# Patient Record
(demographics unavailable — no encounter records)

---

## 2024-11-25 NOTE — REASON FOR VISIT
[Initial Evaluation] : an initial evaluation for [FreeTextEntry2] : bilateral epistaxis [Interpreters_IDNumber] : [id number] [Interpreters_FullName] : [place] [TWNoteComboBox1] : Luxembourger

## 2024-11-25 NOTE — ASSESSMENT
[FreeTextEntry1] : EPISTAXIS INSTRUCTION HEMATOLOGY FOLLOW UP PLATELET ABNORMALITY NO CAUTERIZATION WHEN PLATELET ISSUES A POTENTIAL AGGRAVATING FACTOR IS ADVISED MUPIROCIN OINTMENT F/U HEMATOLOGY

## 2024-11-25 NOTE — PHYSICAL EXAM
[de-identified] : CAROLEE IMPACTED CERUMEN REMOVED/ HEARING IMPROVED [de-identified] : NASAL MUCOSAL CRUSTING/ NO ACTIVE BLEED [Normal] : mucosa is normal [Midline] : trachea located in midline position

## 2024-11-25 NOTE — HISTORY OF PRESENT ILLNESS
[de-identified] : 29 year old man presents for initial evaluation of bilateral epistaxis. He had his nose packed 2-3x per weeks. [place] of Jovani Jaramillo as his treatments in [facility] were limited to nasal packing and occasional platelet transfusion. Currently on Tranexamic Acid.  Bilateral epistaxis R>L. Nosebleeds can last 2-3x days. States has had cauterization. Reports a lot of nasal congestion and the cold weather makes his nosebleeds worse.

## 2024-12-06 NOTE — BEGINNING OF VISIT
[0] : 2) Feeling down, depressed, or hopeless: Not at all (0) [Pain Scale: ___] : On a scale of 1-10, today the patient's pain is a(n) [unfilled]. [Never] : Never [Date Discussed (MM/DD/YY): ___] : Discussed: [unfilled] [With Patient/Caregiver] : with Patient/Caregiver [Abdominal Pain] : no abdominal pain [Vomiting] : no vomiting [Constipation] : no constipation [de-identified] : no diarrhea  [de-identified] : no h/o colitis

## 2024-12-06 NOTE — HISTORY OF PRESENT ILLNESS
[Disease:__________________________] : Disease: [unfilled] [Date: ____________] : Patient's last distress assessment performed on [unfilled]. [0 - No Distress] : Distress Level: 0 [100: Normal, no complaints, no evidence of disease.] : 100: Normal, no complaints, no evidence of disease. 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 He had epistaxis for 3 days last week thus went to urgent care. He had his nose packed twice in the past 4 weeks. he has told me that [place] of Jovani Soullier as his treatments in [facility] were limited to nasal packing and occasional platelet transfusion. He has had rare occasions of packed red cell transfusion none in past year. He presented with a low platelet count noted by age 13 months and ecchymosis. He had spleen removal at age 3 with post operative bleeding and re-exploration of abdomen for packing and platelet infusion in [place]. His illness has not been responsive to steroid therapy. He has had at least one embolization treatment that he describes as an embolization of a bleeding site (CNS/ or head) trough and artery (possibly femoral?). He has been taking tranexamic acid 650 mg PO for bleeding episodes and more recently he has used this medication on a daily prophylactic status. [FreeTextEntry1] : tranexemic acid 650 mg PO daily; iron 65 mg two tablets [de-identified] : second visit: patient is present for laboratory results. No bleeding noted; occasional gum bleeding. He has been using the TSX  06/27/2024: He has had occasional nasal bleeding that has ceased with compression. He has temporality been without transemic acid medication. Prescription had been written (renewal) on 06/24/2024. No ER visit and no transfusion. 07/18/2024 Patient had successful completion of platelet function testing. Difficult interpretation of the aggregation curve possibly due to spontaneous aggregation. There is no family history of thalassemia or iron deficiency. He eats red meat twice a month.  08/21/2024 seen post ER visit this week. He required nasal packing, and he is stable post packing and retention of packing for one day. Bleeding has now stopped. Patient has worsening iron deficiency anemia.  10/2/2024 He is here at a 6 week follow up follow. one  episode of bleeding from nose for 2 days he did not go to hospital; no ER visit. no hospitalization. No falls.  __________ December 04, 2024 - Seen in a f/u visit today accompanied by wife Edith. Pt Latvian Speaking -  services utiliizied Twice during this visit - 1st - Mr. Mckenna ID [id number] self disconnected after 5 minutes, 2nd  Ms. Maloney ID # [id number]. Even with use of  Services patient would ask the wife to repeat the questions and then answer - this mode of communication was repeated throughout the visit. Did not remember exact doses of medications he was taking. Stated no change from what medicines were in his chart.  Pt stated he has been having recurrent intermittent episodes of bilateral epistaxis h/o Cauterization, evaluated by [facility] - advised Mupirocin ointment. Intermittent gum bleeding - unsure exact date of last dental evaluation -- stated maybe January 2024.  States he gets bored at home. No hospitalization, febrile illness, change in medical status.

## 2024-12-06 NOTE — PHYSICAL EXAM
[Fully active, able to carry on all pre-disease performance without restriction] : Status 0 - Fully active, able to carry on all pre-disease performance without restriction [Normal] : affect appropriate [de-identified] : midline abdominal old well healed surgical scar  [de-identified] : numerous tatooes; no ecchymosis, petechiae, bleeding, bruising noted

## 2024-12-06 NOTE — PHYSICAL EXAM
[Fully active, able to carry on all pre-disease performance without restriction] : Status 0 - Fully active, able to carry on all pre-disease performance without restriction [Normal] : affect appropriate [de-identified] : midline abdominal old well healed surgical scar  [de-identified] : numerous tatooes; no ecchymosis, petechiae, bleeding, bruising noted

## 2024-12-06 NOTE — BEGINNING OF VISIT
[0] : 2) Feeling down, depressed, or hopeless: Not at all (0) [Pain Scale: ___] : On a scale of 1-10, today the patient's pain is a(n) [unfilled]. [Never] : Never [Date Discussed (MM/DD/YY): ___] : Discussed: [unfilled] [With Patient/Caregiver] : with Patient/Caregiver [Abdominal Pain] : no abdominal pain [Vomiting] : no vomiting [Constipation] : no constipation [de-identified] : no diarrhea  [de-identified] : no h/o colitis

## 2024-12-06 NOTE — PHYSICAL EXAM
[Fully active, able to carry on all pre-disease performance without restriction] : Status 0 - Fully active, able to carry on all pre-disease performance without restriction [Normal] : affect appropriate [de-identified] : midline abdominal old well healed surgical scar  [de-identified] : numerous tatooes; no ecchymosis, petechiae, bleeding, bruising noted

## 2024-12-06 NOTE — HISTORY OF PRESENT ILLNESS
[Disease:__________________________] : Disease: [unfilled] [Date: ____________] : Patient's last distress assessment performed on [unfilled]. [0 - No Distress] : Distress Level: 0 [100: Normal, no complaints, no evidence of disease.] : 100: Normal, no complaints, no evidence of disease. 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 He had epistaxis for 3 days last week thus went to urgent care. He had his nose packed twice in the past 4 weeks. he has told me that [place] Jovani Soullier as his treatments in [facility] were limited to nasal packing and occasional platelet transfusion. He has had rare occasions of packed red cell transfusion none in past year. He presented with a low platelet count noted by age 13 months and ecchymosis. 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 His illness has not been responsive to steroid therapy. He has had at least one embolization treatment that he describes as an embolization of a bleeding site (CNS/ or head) trough and artery (possibly femoral?). He has been taking tranexamic acid 650 mg PO for bleeding episodes and more recently he has used this medication on a daily prophylactic status. [FreeTextEntry1] : tranexemic acid 650 mg PO daily; iron 65 mg two tablets [de-identified] : second visit: patient is present for laboratory results. No bleeding noted; occasional gum bleeding. He has been using the TSX  06/27/2024: He has had occasional nasal bleeding that has ceased with compression. He has temporality been without transemic acid medication. Prescription had been written (renewal) on 06/24/2024. No ER visit and no transfusion. 07/18/2024 Patient had successful completion of platelet function testing. Difficult interpretation of the aggregation curve possibly due to spontaneous aggregation. There is no family history of thalassemia or iron deficiency. He eats red meat twice a month.  08/21/2024 seen post ER visit this week. He required nasal packing, and he is stable post packing and retention of packing for one day. Bleeding has now stopped. Patient has worsening iron deficiency anemia.  10/2/2024 He is here at a 6 week follow up follow. one  episode of bleeding from nose for 2 days he did not go to hospital; no ER visit. no hospitalization. No falls.  __________ December 04, 2024 - Seen in a f/u visit today accompanied by wife Edith. Pt Thai Speaking -  services utiliizied Twice during this visit - 1st - Mr. Mckenna ID [id number] self disconnected after 5 minutes, 2nd  Ms. Maloney ID # [id number]. Even with use of  Services patient would ask the wife to repeat the questions and then answer - this mode of communication was repeated throughout the visit. Did not remember exact doses of medications he was taking. Stated no change from what medicines were in his chart.  Pt stated he has been having recurrent intermittent episodes of bilateral epistaxis h/o Cauterization, evaluated by [facility] - advised Mupirocin ointment. Intermittent gum bleeding - unsure exact date of last dental evaluation -- stated maybe January 2024.  States he gets bored at home. No hospitalization, febrile illness, change in medical status.

## 2024-12-06 NOTE — HISTORY OF PRESENT ILLNESS
[Disease:__________________________] : Disease: [unfilled] [Date: ____________] : Patient's last distress assessment performed on [unfilled]. [0 - No Distress] : Distress Level: 0 [100: Normal, no complaints, no evidence of disease.] : 100: Normal, no complaints, no evidence of disease. 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 He had epistaxis for 3 days last week thus went to urgent care. He had his nose packed twice in the past 4 weeks. he has told me that [place] Jovani Soullier as his treatments in [facility] were limited to nasal packing and occasional platelet transfusion. He has had rare occasions of packed red cell transfusion none in past year. He presented with a low platelet count noted by age 13 months and ecchymosis. 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 His illness has not been responsive to steroid therapy. He has had at least one embolization treatment that he describes as an embolization of a bleeding site (CNS/ or head) trough and artery (possibly femoral?). He has been taking tranexamic acid 650 mg PO for bleeding episodes and more recently he has used this medication on a daily prophylactic status. [FreeTextEntry1] : tranexemic acid 650 mg PO daily; iron 65 mg two tablets [de-identified] : second visit: patient is present for laboratory results. No bleeding noted; occasional gum bleeding. He has been using the TSX  06/27/2024: He has had occasional nasal bleeding that has ceased with compression. He has temporality been without transemic acid medication. Prescription had been written (renewal) on 06/24/2024. No ER visit and no transfusion. 07/18/2024 Patient had successful completion of platelet function testing. Difficult interpretation of the aggregation curve possibly due to spontaneous aggregation. There is no family history of thalassemia or iron deficiency. He eats red meat twice a month.  08/21/2024 seen post ER visit this week. He required nasal packing, and he is stable post packing and retention of packing for one day. Bleeding has now stopped. Patient has worsening iron deficiency anemia.  10/2/2024 He is here at a 6 week follow up follow. one  episode of bleeding from nose for 2 days he did not go to hospital; no ER visit. no hospitalization. No falls.  __________ December 04, 2024 - Seen in a f/u visit today accompanied by wife Edith. Pt Malaysian Speaking -  services utiliizied Twice during this visit - 1st - Mr. Mckenna ID [id number] self disconnected after 5 minutes, 2nd  Ms. Maloney ID # [id number]. Even with use of  Services patient would ask the wife to repeat the questions and then answer - this mode of communication was repeated throughout the visit. Did not remember exact doses of medications he was taking. Stated no change from what medicines were in his chart.  Pt stated he has been having recurrent intermittent episodes of bilateral epistaxis h/o Cauterization, evaluated by [facility] - advised Mupirocin ointment. Intermittent gum bleeding - unsure exact date of last dental evaluation -- stated maybe January 2024.  States he gets bored at home. No hospitalization, febrile illness, change in medical status.

## 2024-12-06 NOTE — BEGINNING OF VISIT
[0] : 2) Feeling down, depressed, or hopeless: Not at all (0) [Pain Scale: ___] : On a scale of 1-10, today the patient's pain is a(n) [unfilled]. [Never] : Never [Date Discussed (MM/DD/YY): ___] : Discussed: [unfilled] [With Patient/Caregiver] : with Patient/Caregiver [Abdominal Pain] : no abdominal pain [Vomiting] : no vomiting [Constipation] : no constipation [de-identified] : no diarrhea  [de-identified] : no h/o colitis

## 2024-12-06 NOTE — PHYSICAL EXAM
[Fully active, able to carry on all pre-disease performance without restriction] : Status 0 - Fully active, able to carry on all pre-disease performance without restriction [Normal] : affect appropriate [de-identified] : midline abdominal old well healed surgical scar  [de-identified] : numerous tatooes; no ecchymosis, petechiae, bleeding, bruising noted

## 2024-12-06 NOTE — REASON FOR VISIT
[Follow-Up Visit] : a follow-up visit for [Blood Count Assessment] : blood count assessment [Coagulopathy] : coagulopathy [Spouse] : spouse [[employer] ] : provided by [employer]   [Other: _____] : [unfilled] [Time Spent: ____ minutes] : Total time spent using  services: [unfilled] minutes. The patient's primary language is not English thus required  services. [FreeTextEntry2] : Jovani Soullier syndrome, Thromocytopenia  [Interpreters_IDNumber] : [id number] & [id number] [Interpreters_FullName] : Bala Maloney  [TWNoteComboBox1] : Finnish

## 2024-12-06 NOTE — ASSESSMENT
[Supportive] : Goals of care discussed with patient: Supportive [Palliative Care Plan] : not applicable at this time [FreeTextEntry1] : Alex Dumont is a 28-year-old male with a diagnosis of Jovani Soullier platelet abnormality. Diagnosis was established in 2023 in documents present in-patient cell phone in [facility]. I have reviewed these documents in Bengali with the patient in the past as documented in in the prior notes.  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 The epistaxis remains a problem  epistaxis is now controlled. Discussion of resumption of the oral iron therapy for the patient. Prescriptions are written for stool softener, iron, and trans emic acid.  RTC in 8 weeks for follow up.by Susana KAPLAN _______________ December 04, 2024 - Patient is a 30 y/o Bengali Speaking Male w h/o Jovani Soullier Platelet Abnormality, Splenectomy, Recurrent Epistaxis seen in a f/u visit today.   Today's  CBC - WBC = 16.32, Hgb = 9.7, PLT = 141, 000, MCV = 66.9, - results d/w pt & wife copy given  1) Jovani Soullier PLT Abnormality - Thombocytopenia - PLT = 141 000 - stable  pt on Tranexamic Acid to take prn for bleeding - Continue prn - prescriptions current w refill vWF activity & Antigen, Ferritin, Iron studies, results pending  2) Anemia - on Oral iron - continue oral iron with OJ or Vitamin C to promote absorption, incorporate dietary iron rich foods, take stool softener prn 3) Leukocytosis - no s/s of infection - like reactive will monitor & repeat  4) MIPS Measures Questionnaire Completed  5) f/u w PCP, Dentist, ENT & other providers at their discretion - pt has no PCP - will email [facility] for PCP referral  RTO in 1 month or sooner prn  Medication compliance, physician f/u recommendations was advised. Pt / wife counseled to bring names / doses of all medications during visit. Avoid OTC / herbal supplements.  Significant amount of time was spent on counseling & educating the patient during this visit.  All questions, concerns were addressed with patient & wife during this visit to ther satisfaction.  Case management, care plan d/w Dr Rodrigue Hubbard    Services were utilized twice - due to connectivity issues  Even with use of  Services patient would ask the wife to repeat the questions and then answer - this mode of communication was repeated throughout the visit.

## 2024-12-06 NOTE — RESULTS/DATA
[FreeTextEntry1] : platelet count 133 000; review of peripheral blood smear shows large platelets with basophilic center (appears as nucleoid but not nucleus); irregular borders. 05/08/2024 CBC WBC 9.50 HGB 9.2g/dL  000 copy given to the patient with an explanation 06/27/2024 CBC WBC 6 HGB 8.7 MCV 64  000 07/18/2024 CBC WBC 15.94 HGB 9.5g/dL MCV 66  000 08/21/2024 CBC WBC 15.2 HGB  .6g/dL  MCV 65  PL clumped _____________ December 04, 2024 - CBC - WBC = 16.32, Hgb = 9.7, PLT = 141, 000, MCV = 66.9,  ANC = 11.72. Monocyte = 1.61, Basophil # 0.22  Normal Retic Count Iron = 21, % Fe Sat = 5%, TIBC = 475, Ferritin = 7,  vWF activity & antigen WNL  _______________

## 2024-12-06 NOTE — REVIEW OF SYSTEMS
[Diarrhea: Grade 0] : Diarrhea: Grade 0 [Easy Bleeding] : a tendency for easy bleeding [Easy Bruising] : a tendency for easy bruising [Negative] : Allergic/Immunologic [Swollen Glands] : no swollen glands [de-identified] : numerous tattoos present trunk, arms and back without bleeding. [de-identified] : gums

## 2024-12-06 NOTE — REASON FOR VISIT
[Follow-Up Visit] : a follow-up visit for [Blood Count Assessment] : blood count assessment [Coagulopathy] : coagulopathy [Spouse] : spouse [[employer] ] : provided by [employer]   [Other: _____] : [unfilled] [Time Spent: ____ minutes] : Total time spent using  services: [unfilled] minutes. The patient's primary language is not English thus required  services. [FreeTextEntry2] : Jovani Soullier syndrome, Thromocytopenia  [Interpreters_IDNumber] : [id number] & [id number] [Interpreters_FullName] : Bala Maloney  [TWNoteComboBox1] : Burkinan

## 2024-12-06 NOTE — REASON FOR VISIT
[Follow-Up Visit] : a follow-up visit for [Blood Count Assessment] : blood count assessment [Coagulopathy] : coagulopathy [Spouse] : spouse [[employer] ] : provided by [employer]   [Other: _____] : [unfilled] [Time Spent: ____ minutes] : Total time spent using  services: [unfilled] minutes. The patient's primary language is not English thus required  services. [FreeTextEntry2] : Jovani Soullier syndrome, Thromocytopenia  [Interpreters_IDNumber] : [id number] & [id number] [Interpreters_FullName] : Bala Maloney  [TWNoteComboBox1] : Serbian

## 2024-12-06 NOTE — REVIEW OF SYSTEMS
[Diarrhea: Grade 0] : Diarrhea: Grade 0 [Easy Bleeding] : a tendency for easy bleeding [Easy Bruising] : a tendency for easy bruising [Negative] : Allergic/Immunologic [Swollen Glands] : no swollen glands [de-identified] : numerous tattoos present trunk, arms and back without bleeding. [de-identified] : gums

## 2024-12-06 NOTE — REVIEW OF SYSTEMS
[Diarrhea: Grade 0] : Diarrhea: Grade 0 [Easy Bleeding] : a tendency for easy bleeding [Easy Bruising] : a tendency for easy bruising [Negative] : Allergic/Immunologic [Swollen Glands] : no swollen glands [de-identified] : numerous tattoos present trunk, arms and back without bleeding. [de-identified] : gums

## 2024-12-06 NOTE — REASON FOR VISIT
[Follow-Up Visit] : a follow-up visit for [Blood Count Assessment] : blood count assessment [Coagulopathy] : coagulopathy [Spouse] : spouse [[employer] ] : provided by [employer]   [Other: _____] : [unfilled] [Time Spent: ____ minutes] : Total time spent using  services: [unfilled] minutes. The patient's primary language is not English thus required  services. [FreeTextEntry2] : Jovani Soullier syndrome, Thromocytopenia  [Interpreters_IDNumber] : [id number] & [id number] [Interpreters_FullName] : Bala Maloney  [TWNoteComboBox1] : Ecuadorean

## 2024-12-06 NOTE — ASSESSMENT
[Supportive] : Goals of care discussed with patient: Supportive [Palliative Care Plan] : not applicable at this time [FreeTextEntry1] : Alex Dumont is a 28-year-old male with a diagnosis of Jovani Soullier platelet abnormality. Diagnosis was established in 2023 in documents present in-patient cell phone in [facility]. I have reviewed these documents in [place] with the patient in the past as documented in in the prior notes.  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 The epistaxis remains a problem  epistaxis is now controlled. Discussion of resumption of the oral iron therapy for the patient. Prescriptions are written for stool softener, iron, and trans emic acid.  RTC in 8 weeks for follow up.by Susana KAPLAN _______________ December 04, 2024 - Patient is a 28 y/o [place]inyar[place] Speaking Male w h/o Jovani Soullier Platelet Abnormality, Splenectomy, Recurrent Epistaxis seen in a f/u visit today.   Today's  CBC - WBC = 16.32, Hgb = 9.7, PLT = 141, 000, MCV = 66.9, - results d/w pt & wife copy given  1) Jovani Soullier PLT Abnormality - Thombocytopenia - PLT = 141 000 - stable  pt on Tranexamic Acid to take prn for bleeding - Continue prn - prescriptions current w refill vWF activity & Antigen, Ferritin, Iron studies, results pending  2) Anemia - on Oral iron - continue oral iron with OJ or Vitamin C to promote absorption, incorporate dietary iron rich foods, take stool softener prn 3) Leukocytosis - no s/s of infection - like reactive will monitor & repeat  4) MIPS Measures Questionnaire Completed  5) f/u w PCP, Dentist, ENT & other providers at their discretion - pt has no PCP - will email [facility] for PCP referral  RTO in 1 month or sooner prn  Medication compliance, physician f/u recommendations was advised. Pt / wife counseled to bring names / doses of all medications during visit. Avoid OTC / herbal supplements.  Significant amount of time was spent on counseling & educating the patient during this visit.  All questions, concerns were addressed with patient & wife during this visit to ther satisfaction.  Case management, care plan d/w Dr Rodrigue Hubbard    Services were utilized twice - due to connectivity issues  Even with use of  Services patient would ask the wife to repeat the questions and then answer - this mode of communication was repeated throughout the visit.

## 2024-12-06 NOTE — ASSESSMENT
[Supportive] : Goals of care discussed with patient: Supportive [Palliative Care Plan] : not applicable at this time [FreeTextEntry1] : Alex Dumont is a 28-year-old male with a diagnosis of Jovani Soullier platelet abnormality. Diagnosis was established in 2023 in documents present in-patient cell phone in [facility]. I have reviewed these documents in [place] with the patient in the past as documented in in the prior notes.  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 The epistaxis remains a problem  epistaxis is now controlled. Discussion of resumption of the oral iron therapy for the patient. Prescriptions are written for stool softener, iron, and trans emic acid.  RTC in 8 weeks for follow up.by Susana KAPLAN _______________ December 04, 2024 - Patient is a 30 y/o [place] Speaking Male w h/o Jovani Soullier Platelet Abnormality, Splenectomy, Recurrent Epistaxis seen in a f/u visit today.   Today's  CBC - WBC = 16.32, Hgb = 9.7, PLT = 141, 000, MCV = 66.9, - results d/w pt & wife copy given  1) Jovani Soullier PLT Abnormality - Thombocytopenia - PLT = 141 000 - stable  pt on Tranexamic Acid to take prn for bleeding - Continue prn - prescriptions current w refill vWF activity & Antigen, Ferritin, Iron studies, results pending  2) Anemia - on Oral iron - continue oral iron with OJ or Vitamin C to promote absorption, incorporate dietary iron rich foods, take stool softener prn 3) Leukocytosis - no s/s of infection - like reactive will monitor & repeat  4) MIPS Measures Questionnaire Completed  5) f/u w PCP, Dentist, ENT & other providers at their discretion - pt has no PCP - will email [facility] for PCP referral  RTO in 1 month or sooner prn  Medication compliance, physician f/u recommendations was advised. Pt / wife counseled to bring names / doses of all medications during visit. Avoid OTC / herbal supplements.  Significant amount of time was spent on counseling & educating the patient during this visit.  All questions, concerns were addressed with patient & wife during this visit to ther satisfaction.  Case management, care plan d/w Dr Rodrigue Hubbard    Services were utilized twice - due to connectivity issues  Even with use of  Services patient would ask the wife to repeat the questions and then answer - this mode of communication was repeated throughout the visit.

## 2024-12-06 NOTE — BEGINNING OF VISIT
[0] : 2) Feeling down, depressed, or hopeless: Not at all (0) [Pain Scale: ___] : On a scale of 1-10, today the patient's pain is a(n) [unfilled]. [Never] : Never [Date Discussed (MM/DD/YY): ___] : Discussed: [unfilled] [With Patient/Caregiver] : with Patient/Caregiver [Abdominal Pain] : no abdominal pain [Vomiting] : no vomiting [Constipation] : no constipation [de-identified] : no diarrhea  [de-identified] : no h/o colitis

## 2024-12-06 NOTE — ASSESSMENT
[Supportive] : Goals of care discussed with patient: Supportive [Palliative Care Plan] : not applicable at this time [FreeTextEntry1] : Alex Dumont is a 28-year-old male with a diagnosis of Jovani Soullier platelet abnormality. Diagnosis was established in 2023 in documents present in-patient cell phone in [facility]. I have reviewed these documents in Bengali with the patient in the past as documented in in the prior notes.  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 The epistaxis remains a problem  epistaxis is now controlled. Discussion of resumption of the oral iron therapy for the patient. Prescriptions are written for stool softener, iron, and trans emic acid.  RTC in 8 weeks for follow up.by Susana KPALAN _______________ December 04, 2024 - Patient is a 28 y/o Bengali Speaking Male w h/o Jovani Soullier Platelet Abnormality, Splenectomy, Recurrent Epistaxis seen in a f/u visit today.   Today's  CBC - WBC = 16.32, Hgb = 9.7, PLT = 141, 000, MCV = 66.9, - results d/w pt & wife copy given  1) Jovani Soullier PLT Abnormality - Thombocytopenia - PLT = 141 000 - stable  pt on Tranexamic Acid to take prn for bleeding - Continue prn - prescriptions current w refill vWF activity & Antigen, Ferritin, Iron studies, results pending  2) Anemia - on Oral iron - continue oral iron with OJ or Vitamin C to promote absorption, incorporate dietary iron rich foods, take stool softener prn 3) Leukocytosis - no s/s of infection - like reactive will monitor & repeat  4) MIPS Measures Questionnaire Completed  5) f/u w PCP, Dentist, ENT & other providers at their discretion - pt has no PCP - will email [facility] for PCP referral  RTO in 1 month or sooner prn  Medication compliance, physician f/u recommendations was advised. Pt / wife counseled to bring names / doses of all medications during visit. Avoid OTC / herbal supplements.  Significant amount of time was spent on counseling & educating the patient during this visit.  All questions, concerns were addressed with patient & wife during this visit to ther satisfaction.  Case management, care plan d/w Dr Rodrigue Hubbard    Services were utilized twice - due to connectivity issues  Even with use of  Services patient would ask the wife to repeat the questions and then answer - this mode of communication was repeated throughout the visit.

## 2024-12-06 NOTE — REVIEW OF SYSTEMS
[Diarrhea: Grade 0] : Diarrhea: Grade 0 [Easy Bleeding] : a tendency for easy bleeding [Easy Bruising] : a tendency for easy bruising [Negative] : Allergic/Immunologic [Swollen Glands] : no swollen glands [de-identified] : numerous tattoos present trunk, arms and back without bleeding. [de-identified] : gums

## 2024-12-06 NOTE — HISTORY OF PRESENT ILLNESS
[Disease:__________________________] : Disease: [unfilled] [Date: ____________] : Patient's last distress assessment performed on [unfilled]. [0 - No Distress] : Distress Level: 0 [100: Normal, no complaints, no evidence of disease.] : 100: Normal, no complaints, no evidence of disease. 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 He had epistaxis for 3 days last week thus went to urgent care. He had his nose packed twice in the past 4 weeks. he has told me that [place] of Jovani Soullier as his treatments in [facility] were limited to nasal packing and occasional platelet transfusion. He has had rare occasions of packed red cell transfusion none in past year. He presented with a low platelet count noted by age 13 months and ecchymosis. He had spleen removal at age 3 with post operative bleeding and re-exploration of abdomen for packing and platelet infusion in [place]. His illness has not been responsive to steroid therapy. He has had at least one embolization treatment that he describes as an embolization of a bleeding site (CNS/ or head) trough and artery (possibly femoral?). He has been taking tranexamic acid 650 mg PO for bleeding episodes and more recently he has used this medication on a daily prophylactic status. [FreeTextEntry1] : tranexemic acid 650 mg PO daily; iron 65 mg two tablets [de-identified] : second visit: patient is present for laboratory results. No bleeding noted; occasional gum bleeding. He has been using the TSX  06/27/2024: He has had occasional nasal bleeding that has ceased with compression. He has temporality been without transemic acid medication. Prescription had been written (renewal) on 06/24/2024. No ER visit and no transfusion. 07/18/2024 Patient had successful completion of platelet function testing. Difficult interpretation of the aggregation curve possibly due to spontaneous aggregation. There is no family history of thalassemia or iron deficiency. He eats red meat twice a month.  08/21/2024 seen post ER visit this week. He required nasal packing, and he is stable post packing and retention of packing for one day. Bleeding has now stopped. Patient has worsening iron deficiency anemia.  10/2/2024 He is here at a 6 week follow up follow. one  episode of bleeding from nose for 2 days he did not go to hospital; no ER visit. no hospitalization. No falls.  __________ December 04, 2024 - Seen in a f/u visit today accompanied by wife Edith. Pt Citizen of [place] Speaking -  services utiliizied Twice during this visit - 1st - Mr. Mckenna ID [id number] self disconnected after 5 minutes, 2nd  Ms. Maloney ID # [id number]. Even with use of  Services patient would ask the wife to repeat the questions and then answer - this mode of communication was repeated throughout the visit. Did not remember exact doses of medications he was taking. Stated no change from what medicines were in his chart.  Pt stated he has been having recurrent intermittent episodes of bilateral epistaxis h/o Cauterization, evaluated by [facility] - advised Mupirocin ointment. Intermittent gum bleeding - unsure exact date of last dental evaluation -- stated maybe January 2024.  States he gets bored at home. No hospitalization, febrile illness, change in medical status.

## 2025-01-06 NOTE — HISTORY OF PRESENT ILLNESS
[de-identified] : epistasis and thrombocytopenia Evaluation in [facility]: finding consistent with Jovani Soullier Syndrome 28 yo gentleman presents. He had epistaxis for 3 days last week thus went to urgent care. He had his nose packed twice in the past 4 weeks. he has told me that [place] of Jovani Soullier as his treatments in [facility] were limited to nasal packing and occasional platelet transfusion. He has had rare occasions of packed red cell transfusion none in past year. He presented with a low platelet count noted by age 13 months and ecchymosis. He had spleen removal at age 3 with post operative bleeding and re-exploration of abdomen for packing and platelet infusion in [place]. His illness has not been responsive to steroid therapy. He has had at least one embolization treatment that he describes as an embolization of a bleeding site (CNS/ or head) trough and artery (possibly femoral?). He has been taking tranexamic acid 650 mg PO for bleeding episodes and more recently he has used this medication on a daily prophylactic status. [FreeTextEntry1] : tranexemic acid 650 mg PO daily; iron 65 mg two tablets [de-identified] : second visit: patient is present for laboratory results. No bleeding noted; occasional gum bleeding. He has been using the TSX  06/27/2024: He has had occasional nasal bleeding that has ceased with compression. He has temporality been without transemic acid medication. Prescription had been written (renewal) on 06/24/2024  10/2/2024 He is here at a 6 week follow up follow. one episode of bleeding from nose for 2 days he did not go to hospital: no ER visit. no hospitalization. No falls. Did not remember exact doses of medications he was taking. Stated no change from what medicines were in his chart.  01/02/2025 He was at the [facility] for nasal bleeding and a cold. Seen in ER on 27 December 2024.  he has taken transemic acid He has not picked up the transemic acid prescription yet that was placed on 30 December 2024

## 2025-01-06 NOTE — REVIEW OF SYSTEMS
[Nosebleeds] : nosebleeds [Swollen Glands] : no swollen glands [de-identified] : numerous tattoos present trunk, arms and back without bleeding. [de-identified] : gums

## 2025-01-06 NOTE — REASON FOR VISIT
[Other: _____] : [unfilled] [Time Spent: ____ minutes] : Total time spent using  services: [unfilled] minutes. The patient's primary language is not English thus required  services. [FreeTextEntry2] : Jovani Soullier syndrome, Thromocytopenia  [Interpreters_IDNumber] : [id number] [Interpreters_FullName] : Erich [TWNoteComboBox1] : Trinidadian

## 2025-01-06 NOTE — PHYSICAL EXAM
[de-identified] : midline abdominal old well healed surgical scar  [de-identified] : numerous tatooes; no ecchymosis, petechiae, bleeding, bruising noted

## 2025-01-06 NOTE — REASON FOR VISIT
[Other: _____] : [unfilled] [Time Spent: ____ minutes] : Total time spent using  services: [unfilled] minutes. The patient's primary language is not English thus required  services. [FreeTextEntry2] : Jovani Soullier syndrome, Thromocytopenia  [Interpreters_IDNumber] : [id number] [Interpreters_FullName] : Erich [TWNoteComboBox1] : Citizen of [place]

## 2025-01-06 NOTE — ASSESSMENT
[FreeTextEntry1] : Alex Dumont is a 28-year-old male with a diagnosis of Jovani Soullier platelet abnormality. Diagnosis was established in 2023 in documents present in-patient cell phone in [facility]. I have reviewed these documents in Nepali with the patient in the past as documented in in the prior notes.  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 The epistaxis remains a problem  epistaxis is now controlled. Discussion of resumption of the oral iron therapy for the patient. Prescriptions are written for stool softener, iron, and trans emic acid.  RTC in 8 weeks for follow up.by Susana KAPLAN _______________ December 04, 2024 - Patient is a 28 y/o Nepali Speaking Male w h/o Jovani Soullier Platelet Abnormality, Splenectomy, Recurrent Epistaxis seen in a f/u visit today.   27 December 2024 CBC - WBC = 13.56, Hgb = 9.0, PLT = 124, 000, MCV = 66.9, - results d/w pt & wife copy given of results and results of 4 December 2024  1) Jovani Soullier PLT Abnormality - Thrombocytopenia - PLT = 124 000 - characteristic of congenital thrombocytopenia  pt on Tranexamic Acid to take prn for bleeding - Continue prn - prescriptions current w refill Patient to increase dose to 1 tablet PO q 12h prn. Patient is advised to go to ER if bleeding cannot be controlled with Trans emic acid ; if bleeding is severe, he may need packing or platelet transfusion. Need to reduce platelet transfusion use unless absolutely necessary to prevent allo sensitization.  prior blood testing of vWF activity & Antigen were normal; Ferritin, Iron studies, results pending  2) Anemia - on Oral iron - continue oral iron with OJ or Vitamin C to promote absorption, incorporate dietary iron rich foods, take stool softener prn 3) Leukocytosis - no s/s of infection - like reactive will monitor & repeat  4) MIPS Measures Questionnaire Completed  5) f/u w PCP, Dentist, ENT & other providers at their discretion - pt has no PCP - will email [facility] for PCP referral  RTO in 1 month or sooner prn  Medication compliance, physician f/u recommendations was advised. Pt / wife counseled to bring names / doses of all medications during visit. Avoid OTC / herbal supplements.  Significant amount of time was spent on counseling & educating the patient during this visit.  All questions, concerns were addressed with patient & wife during this visit to ther satisfaction.  Case management, care plan d/w Dr Rodrigue Hubbard    Services were utilized

## 2025-01-06 NOTE — ASSESSMENT
[FreeTextEntry1] : Alex Dumont is a 28-year-old male with a diagnosis of Jovani Soullier platelet abnormality. Diagnosis was established in 2023 in documents present in-patient cell phone in [facility]. I have reviewed these documents in Maori with the patient in the past as documented in in the prior notes.  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 The epistaxis remains a problem  epistaxis is now controlled. Discussion of resumption of the oral iron therapy for the patient. Prescriptions are written for stool softener, iron, and trans emic acid.  RTC in 8 weeks for follow up.by Susana KAPLAN _______________ December 04, 2024 - Patient is a 28 y/o Maori Speaking Male w h/o Jovani Soullier Platelet Abnormality, Splenectomy, Recurrent Epistaxis seen in a f/u visit today.   27 December 2024 CBC - WBC = 13.56, Hgb = 9.0, PLT = 124, 000, MCV = 66.9, - results d/w pt & wife copy given of results and results of 4 December 2024  1) Jovani Soullier PLT Abnormality - Thrombocytopenia - PLT = 124 000 - characteristic of congenital thrombocytopenia  pt on Tranexamic Acid to take prn for bleeding - Continue prn - prescriptions current w refill Patient to increase dose to 1 tablet PO q 12h prn. Patient is advised to go to ER if bleeding cannot be controlled with Trans emic acid ; if bleeding is severe, he may need packing or platelet transfusion. Need to reduce platelet transfusion use unless absolutely necessary to prevent allo sensitization.  prior blood testing of vWF activity & Antigen were normal; Ferritin, Iron studies, results pending  2) Anemia - on Oral iron - continue oral iron with OJ or Vitamin C to promote absorption, incorporate dietary iron rich foods, take stool softener prn 3) Leukocytosis - no s/s of infection - like reactive will monitor & repeat  4) MIPS Measures Questionnaire Completed  5) f/u w PCP, Dentist, ENT & other providers at their discretion - pt has no PCP - will email [facility] for PCP referral  RTO in 1 month or sooner prn  Medication compliance, physician f/u recommendations was advised. Pt / wife counseled to bring names / doses of all medications during visit. Avoid OTC / herbal supplements.  Significant amount of time was spent on counseling & educating the patient during this visit.  All questions, concerns were addressed with patient & wife during this visit to ther satisfaction.  Case management, care plan d/w Dr Rodrigue Hubbard    Services were utilized

## 2025-01-06 NOTE — HISTORY OF PRESENT ILLNESS
[de-identified] : epistasis and thrombocytopenia Evaluation in [facility]: finding consistent with Jovani Soullier Syndrome 30 yo gentleman presents. He had epistaxis for 3 days last week thus went to urgent care. He had his nose packed twice in the past 4 weeks. he has told me that [place] of Jovani Soullier as his treatments in [facility] were limited to nasal packing and occasional platelet transfusion. He has had rare occasions of packed red cell transfusion none in past year. He presented with a low platelet count noted by age 13 months and ecchymosis. He had spleen removal at age 3 with post operative bleeding and re-exploration of abdomen for packing and platelet infusion in [place]. His illness has not been responsive to steroid therapy. He has had at least one embolization treatment that he describes as an embolization of a bleeding site (CNS/ or head) trough and artery (possibly femoral?). He has been taking tranexamic acid 650 mg PO for bleeding episodes and more recently he has used this medication on a daily prophylactic status. [FreeTextEntry1] : tranexemic acid 650 mg PO daily; iron 65 mg two tablets [de-identified] : second visit: patient is present for laboratory results. No bleeding noted; occasional gum bleeding. He has been using the TSX  06/27/2024: He has had occasional nasal bleeding that has ceased with compression. He has temporality been without transemic acid medication. Prescription had been written (renewal) on 06/24/2024  10/2/2024 He is here at a 6 week follow up follow. one episode of bleeding from nose for 2 days he did not go to hospital: no ER visit. no hospitalization. No falls. Did not remember exact doses of medications he was taking. Stated no change from what medicines were in his chart.  01/02/2025 He was at the [facility] for nasal bleeding and a cold. Seen in ER on 27 December 2024.  he has taken transemic acid He has not picked up the transemic acid prescription yet that was placed on 30 December 2024

## 2025-01-06 NOTE — RESULTS/DATA
[FreeTextEntry1] : platelet count 133 000; review of peripheral blood smear shows large platelets with basophilic center (appears as nucleoid but not nucleus); irregular borders. 05/08/2024 CBC WBC 9.50 HGB 9.2g/dL  000 copy given to the patient with an explanation 06/27/2024 CBC WBC 6 HGB 8.7 MCV 64  000 07/18/2024 CBC WBC 15.94 HGB 9.5g/dL MCV 66  000 08/21/2024 CBC WBC 15.2 HGB .6g/dL  MCV 65  PL clumped December 04, 2024 - CBC - WBC = 16.32, Hgb = 9.7, PLT = 141, 000, MCV = 66.9,  ANC = 11.72. Monocyte = 1.61, Basophil # 0.22  Normal Retic Count Iron = 21, % Fe Sat = 5%, TIBC = 475, Ferritin = 7,  vWF activity & antigen WNL  December 27 2024 CBC 13.56 HGB 9.0  000 _______________

## 2025-01-06 NOTE — REVIEW OF SYSTEMS
[Nosebleeds] : nosebleeds [Swollen Glands] : no swollen glands [de-identified] : numerous tattoos present trunk, arms and back without bleeding. [de-identified] : gums yes

## 2025-01-06 NOTE — PHYSICAL EXAM
[de-identified] : midline abdominal old well healed surgical scar  [de-identified] : numerous tatooes; no ecchymosis, petechiae, bleeding, bruising noted

## 2025-04-14 NOTE — HISTORY OF PRESENT ILLNESS
[Disease:__________________________] : Disease: [unfilled] [Date: ____________] : Patient's last distress assessment performed on [unfilled]. [0 - No Distress] : Distress Level: 0 [100: Normal, no complaints, no evidence of disease.] : 100: Normal, no complaints, no evidence of disease. [ECOG Performance Status: 0 - Fully active, able to carry on all pre-disease performance without restriction] : Performance Status: 0 - Fully active, able to carry on all pre-disease performance without restriction [de-identified] : epistasis and thrombocytopenia Evaluation in [place]: finding consistent with Jovani Soullier Syndrome 29yo gentleman presents. He had epistaxis for 3 days last week thus went to urgent care. He had his nose packed twice in the past 4 weeks. he has told me that [place] of Jovani Soullier as his treatments in [facility] were limited to nasal packing and occasional platelet transfusion. He has had rare occasions of packed red cell transfusion none in past year. He presented with a low platelet count noted by age 13 months and ecchymosis. He had spleen removal at age 3 with post operative bleeding and re-exploration of abdomen for packing and platelet infusion in [place]. His illness has not been responsive to steroid therapy. He has had at least one embolization treatment that he describes as an embolization of a bleeding site (CNS/ or head) trough and artery (possibly femoral?). He has been taking tranexamic acid 650 mg PO for bleeding episodes and more recently he has used this medication on a daily prophylactic status. [FreeTextEntry1] : tranexemic acid 650 mg PO daily; iron 65 mg two tablets [de-identified] : second visit: patient is present for laboratory results. No bleeding noted; occasional gum bleeding. He has been using the TSX  06/27/2024: He has had occasional nasal bleeding that has ceased with compression. He has temporality been without transemic acid medication. Prescription had been written (renewal) on 06/24/2024. No ER visit and no transfusion. 07/18/2024 Patient had successful completion of platelet function testing. Difficult interpretation of the aggregation curve possibly due to spontaneous aggregation. There is no family history of thalassemia or iron deficiency. He eats red meat twice a month.  08/21/2024 seen post ER visit this week. He required nasal packing, and he is stable post packing and retention of packing for one day. Bleeding has now stopped. Patient has worsening iron deficiency anemia.    10/2/2024 He is here at a 6 week follow up follow. one  episode of bleeding from nose for 2 days he did not go to hospital; no ER visit. no hospitalization. No falls.  __________ December 04, 2024 - Seen in a f/u visit today accompanied by wife Edith. Pt [place] Speaking -  services utiliizied Twice during this visit - 1st - Mr. Mckenna ID [id number] self disconnected after 5 minutes, 2nd  Ms. Maloney ID # [id number]. Even with use of  Services patient would ask the wife to repeat the questions and then answer - this mode of communication was repeated throughout the visit. Did not remember exact doses of medications he was taking. Stated no change from what medicines were in his chart.  Pt stated he has been having recurrent intermittent episodes of bilateral epistaxis h/o Cauterization, evaluated by [facility] - advised Mupirocin ointment. Intermittent gum bleeding - unsure exact date of last dental evaluation -- stated maybe January 2024.  States he gets bored at home. No hospitalization, febrile illness, change in medical status.   April 14, 2025  Today, the patient comes in for a follow-up visit and a medication refill for Tranexamic Acid. He reports experiencing 3 to 4 episodes of bleeding from both nostrils each month, with more frequent bleeding from the left nostril. The patient states that he takes the medication daily, and during episodes of epistaxis, he uses it up to three times per day for as long as 12 days. He believes that he can take the medication up to three times daily (TID) and reports that he tolerates it well without any significant side effects. The last time he picked up his prescription at the pharmacy was in February 2025. He also mentions that when he takes oral iron, 2 tablets three times a week, the bleeding episodes decrease. The last episode of bleeding occurred about a week ago, and he was hospitalized at [facility] one month ago.  Pt evaluated by ENT advised Nasal packing with improvement in treatment of recurrent Epistaxis

## 2025-04-14 NOTE — PHYSICAL EXAM
[Fully active, able to carry on all pre-disease performance without restriction] : Status 0 - Fully active, able to carry on all pre-disease performance without restriction [Normal] : affect appropriate [de-identified] : midline abdominal old well healed surgical scar  [de-identified] : numerous tatooes; no ecchymosis, petechiae, bleeding, bruising noted

## 2025-04-14 NOTE — REVIEW OF SYSTEMS
[Diarrhea: Grade 0] : Diarrhea: Grade 0 [Easy Bleeding] : a tendency for easy bleeding [Easy Bruising] : a tendency for easy bruising [Negative] : Allergic/Immunologic [Swollen Glands] : no swollen glands [de-identified] : numerous tattoos present trunk, arms and back without bleeding. [FreeTextEntry4] : No evidence of epistasis during the visit [de-identified] : gums

## 2025-04-14 NOTE — REASON FOR VISIT
[Follow-Up Visit] : a follow-up visit for [Blood Count Assessment] : blood count assessment [Coagulopathy] : coagulopathy [Spouse] : spouse [Other: _____] : [unfilled] [Language Line ] : provided by Language Line   [Time Spent: ____ minutes] : Total time spent using  services: [unfilled] minutes. The patient's primary language is not English thus required  services. [FreeTextEntry2] : Jovani Soullier syndrome, Thromocytopenia  [Interpreters_FullName] : Nia [Interpreters_IDNumber] : [id number]; [id number] [TWNoteComboBox1] : Swiss

## 2025-04-14 NOTE — REASON FOR VISIT
[Follow-Up Visit] : a follow-up visit for [Blood Count Assessment] : blood count assessment [Coagulopathy] : coagulopathy [Spouse] : spouse [Other: _____] : [unfilled] [Language Line ] : provided by Language Line   [Time Spent: ____ minutes] : Total time spent using  services: [unfilled] minutes. The patient's primary language is not English thus required  services. [FreeTextEntry2] : Jovani Soullier syndrome, Thromocytopenia  [Interpreters_FullName] : Nia [Interpreters_IDNumber] : [id number]; [id number] [TWNoteComboBox1] : Zambian

## 2025-04-14 NOTE — ASSESSMENT
[Supportive] : Goals of care discussed with patient: Supportive [Palliative Care Plan] : not applicable at this time [FreeTextEntry1] : Alex Dumont is a 28-year-old male with a diagnosis of Jovani Soullier platelet abnormality. Diagnosis was established in 2023 in documents present in-patient cell phone in [facility]. I have reviewed these documents in Armenian with the patient in the past as documented in in the prior notes.  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 The epistaxis remains a problem  epistaxis is now controlled. Discussion of resumption of the oral iron therapy for the patient. Prescriptions are written for stool softener, iron, and trans emic acid.  RTC in 8 weeks for follow up.by Susana KAPLAN _______________ December 04, 2024 - Patient is a 28 y/o Armenian Speaking Male w h/o Jovani Soullier Platelet Abnormality, Splenectomy, Recurrent Epistaxis seen in a f/u visit today.  Today's  CBC - WBC = 16.32, Hgb = 9.7, PLT = 141, 000, MCV = 66.9, - results d/w pt & wife copy given 1) Jovani Soullier PLT Abnormality - Thombocytopenia - PLT = 141 000 - stable  pt on Tranexamic Acid to take prn for bleeding - Continue prn - prescriptions current w refill vWF activity & Antigen, Ferritin, Iron studies, results pending 2) Anemia - on Oral iron - continue oral iron with OJ or Vitamin C to promote absorption, incorporate dietary iron rich foods, take stool softener prn 3) Leukocytosis - no s/s of infection - like reactive will monitor & repeat  4) MIPS Measures Questionnaire Completed  5) f/u w PCP, Dentist, ENT & other providers at their discretion - pt has no PCP - will email [facility] for PCP referral  RTO in 1 month or sooner prn  Medication compliance, physician f/u recommendations was advised. Pt / wife counseled to bring names / doses of all medications during visit. Avoid OTC / herbal supplements.  Significant amount of time was spent on counseling & educating the patient during this visit.  All questions, concerns were addressed with patient & wife during this visit to ther satisfaction.  Case management, care plan d/w Dr Rodrigue Hubbard    Services were utilized twice - due to connectivity issues  Even with use of  Services patient would ask the wife to repeat the questions and then answer - this mode of communication was repeated throughout the visit. 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 His diagnosis was confirmed in 2023 based on documents found on his cell phone while he was a patient at a tertiary referral hospital in [facility] [place]. He is concerned about the refills for his medication, Tranexamic Acid.  He reports experiencing 3 to 4 episodes of bleeding from both nostrils each month, with more frequent bleeding from the left nostril. The patient states that he takes the medication daily, and during episodes of epistaxis, he uses it up to three times per day for as long as 12 days. He believes that he can take the medication up to three times daily (TID) and reports that he tolerates it well without any significant side effects. The last time he picked up his prescription at the pharmacy was in February 2025   Labs: April 14, 2025 RET %  = 1.5% Absolute Ret = 74.6 K/uL	 RBC 4.94; WBC 12.85 K/uL; Hgb 8.6 g/dl; MCV 64.8; HCT 32.0%; PLT 127K/ul  1) Jovani Soullier PLT Abnormality - Thombocytopenia  - w Recurrent Epistaxis - today's PLT = 127  -  Continue the medication regimen as prescribed: take Tranexamic Acid every 12 hours as needed for epistaxis (nosebleeds).  2) Microcytic Anemia - Continue iron supplementation with vitamin C, preferably taken with orange juice. It is also advised to increase the consumption of iron-rich foods, such as red meats. Repeat Ferritin, Iron studies results pending - Monitor for and report any signs of bleeding - Patient advised to keep a diary / Log to monitor bleeding episodes and use of Tranexamic Acid   3) f/u w ENT for evaluation for cauterization procedure to treat Recurrent Epistaxis - Pt evaluated by ENT advised Nasal packing with improvement in treatment of recurrent Epistaxis   - Schedule a follow-up appointment in 2 weeks.  Services were utilized twice - due to connectivity issues

## 2025-04-14 NOTE — ASSESSMENT
[Supportive] : Goals of care discussed with patient: Supportive [Palliative Care Plan] : not applicable at this time [FreeTextEntry1] : Alex Dumont is a 28-year-old male with a diagnosis of Jovani Soullier platelet abnormality. Diagnosis was established in 2023 in documents present in-patient cell phone in [facility]. I have reviewed these documents in Belarusian with the patient in the past as documented in in the prior notes.  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 The epistaxis remains a problem  epistaxis is now controlled. Discussion of resumption of the oral iron therapy for the patient. Prescriptions are written for stool softener, iron, and trans emic acid.  RTC in 8 weeks for follow up.by Susana KAPLAN _______________ December 04, 2024 - Patient is a 28 y/o Belarusian Speaking Male w h/o Jovani Soullier Platelet Abnormality, Splenectomy, Recurrent Epistaxis seen in a f/u visit today.  Today's  CBC - WBC = 16.32, Hgb = 9.7, PLT = 141, 000, MCV = 66.9, - results d/w pt & wife copy given 1) Jovani Soullier PLT Abnormality - Thombocytopenia - PLT = 141 000 - stable  pt on Tranexamic Acid to take prn for bleeding - Continue prn - prescriptions current w refill vWF activity & Antigen, Ferritin, Iron studies, results pending 2) Anemia - on Oral iron - continue oral iron with OJ or Vitamin C to promote absorption, incorporate dietary iron rich foods, take stool softener prn 3) Leukocytosis - no s/s of infection - like reactive will monitor & repeat  4) MIPS Measures Questionnaire Completed  5) f/u w PCP, Dentist, ENT & other providers at their discretion - pt has no PCP - will email [facility] for PCP referral  RTO in 1 month or sooner prn  Medication compliance, physician f/u recommendations was advised. Pt / wife counseled to bring names / doses of all medications during visit. Avoid OTC / herbal supplements.  Significant amount of time was spent on counseling & educating the patient during this visit.  All questions, concerns were addressed with patient & wife during this visit to ther satisfaction.  Case management, care plan d/w Dr Rodrigue Hubbard    Services were utilized twice - due to connectivity issues  Even with use of  Services patient would ask the wife to repeat the questions and then answer - this mode of communication was repeated throughout the visit. 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 His diagnosis was confirmed in 2023 based on documents found on his cell phone while he was a patient at a tertiary referral hospital in [facility] [place]. He is concerned about the refills for his medication, Tranexamic Acid.  He reports experiencing 3 to 4 episodes of bleeding from both nostrils each month, with more frequent bleeding from the left nostril. The patient states that he takes the medication daily, and during episodes of epistaxis, he uses it up to three times per day for as long as 12 days. He believes that he can take the medication up to three times daily (TID) and reports that he tolerates it well without any significant side effects. The last time he picked up his prescription at the pharmacy was in February 2025   Labs: April 14, 2025 RET %  = 1.5% Absolute Ret = 74.6 K/uL	 RBC 4.94; WBC 12.85 K/uL; Hgb 8.6 g/dl; MCV 64.8; HCT 32.0%; PLT 127K/ul  1) Jovani Soullier PLT Abnormality - Thombocytopenia  - w Recurrent Epistaxis - today's PLT = 127  -  Continue the medication regimen as prescribed: take Tranexamic Acid every 12 hours as needed for epistaxis (nosebleeds).  2) Microcytic Anemia - Continue iron supplementation with vitamin C, preferably taken with orange juice. It is also advised to increase the consumption of iron-rich foods, such as red meats. Repeat Ferritin, Iron studies results pending - Monitor for and report any signs of bleeding - Patient advised to keep a diary / Log to monitor bleeding episodes and use of Tranexamic Acid   3) f/u w ENT for evaluation for cauterization procedure to treat Recurrent Epistaxis - Pt evaluated by ENT advised Nasal packing with improvement in treatment of recurrent Epistaxis   - Schedule a follow-up appointment in 2 weeks.  Services were utilized twice - due to connectivity issues

## 2025-04-14 NOTE — HISTORY OF PRESENT ILLNESS
[Disease:__________________________] : Disease: [unfilled] [Date: ____________] : Patient's last distress assessment performed on [unfilled]. [0 - No Distress] : Distress Level: 0 [100: Normal, no complaints, no evidence of disease.] : 100: Normal, no complaints, no evidence of disease. [ECOG Performance Status: 0 - Fully active, able to carry on all pre-disease performance without restriction] : Performance Status: 0 - Fully active, able to carry on all pre-disease performance without restriction [de-identified] : epistasis and thrombocytopenia Evaluation in [place]: finding consistent with Jovani Soullier Syndrome 27yo gentleman presents. He had epistaxis for 3 days last week thus went to urgent care. He had his nose packed twice in the past 4 weeks. he has told me that he has come to the [place] for further treatment of Jovani Soullier as his treatments in [facility] were limited to nasal packing and occasional platelet transfusion. He has had rare occasions of packed red cell transfusion none in past year. He presented with a low platelet count noted by age 13 months and ecchymosis. He had spleen removal at age 3 with post operative bleeding and re-exploration of abdomen for packing and platelet infusion in [place]. His illness has not been responsive to steroid therapy. He has had at least one embolization treatment that he describes as an embolization of a bleeding site (CNS/ or head) trough and artery (possibly femoral?). He has been taking tranexamic acid 650 mg PO for bleeding episodes and more recently he has used this medication on a daily prophylactic status. [FreeTextEntry1] : tranexemic acid 650 mg PO daily; iron 65 mg two tablets [de-identified] : second visit: patient is present for laboratory results. No bleeding noted; occasional gum bleeding. He has been using the TSX  06/27/2024: He has had occasional nasal bleeding that has ceased with compression. He has temporality been without transemic acid medication. Prescription had been written (renewal) on 06/24/2024. No ER visit and no transfusion. 07/18/2024 Patient had successful completion of platelet function testing. Difficult interpretation of the aggregation curve possibly due to spontaneous aggregation. There is no family history of thalassemia or iron deficiency. He eats red meat twice a month.  08/21/2024 seen post ER visit this week. He required nasal packing, and he is stable post packing and retention of packing for one day. Bleeding has now stopped. Patient has worsening iron deficiency anemia.    10/2/2024 He is here at a 6 week follow up follow. one  episode of bleeding from nose for 2 days he did not go to hospital; no ER visit. no hospitalization. No falls.  __________ December 04, 2024 - Seen in a f/u visit today accompanied by wife Edith. Pt French Speaking -  services utiliizied Twice during this visit - 1st - Mr. Mckenna ID [id number] self disconnected after 5 minutes, 2nd  Ms. Maloney ID # [id number]. Even with use of  Services patient would ask the wife to repeat the questions and then answer - this mode of communication was repeated throughout the visit. Did not remember exact doses of medications he was taking. Stated no change from what medicines were in his chart.  Pt stated he has been having recurrent intermittent episodes of bilateral epistaxis h/o Cauterization, evaluated by [facility] - advised Mupirocin ointment. Intermittent gum bleeding - unsure exact date of last dental evaluation -- stated maybe January 2024.  States he gets bored at home. No hospitalization, febrile illness, change in medical status.   April 14, 2025  Today, the patient comes in for a follow-up visit and a medication refill for Tranexamic Acid. He reports experiencing 3 to 4 episodes of bleeding from both nostrils each month, with more frequent bleeding from the left nostril. The patient states that he takes the medication daily, and during episodes of epistaxis, he uses it up to three times per day for as long as 12 days. He believes that he can take the medication up to three times daily (TID) and reports that he tolerates it well without any significant side effects. The last time he picked up his prescription at the pharmacy was in February 2025. He also mentions that when he takes oral iron, 2 tablets three times a week, the bleeding episodes decrease. The last episode of bleeding occurred about a week ago, and he was hospitalized at [facility] one month ago.  Pt evaluated by ENT advised Nasal packing with improvement in treatment of recurrent Epistaxis

## 2025-04-14 NOTE — ASSESSMENT
[Supportive] : Goals of care discussed with patient: Supportive [Palliative Care Plan] : not applicable at this time [FreeTextEntry1] : Alex Dumont is a 28-year-old male with a diagnosis of Jovani Soullier platelet abnormality. Diagnosis was established in 2023 in documents present in-patient cell phone in [facility]. I have reviewed these documents in Portuguese with the patient in the past as documented in in the prior notes.  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 The epistaxis remains a problem  epistaxis is now controlled. Discussion of resumption of the oral iron therapy for the patient. Prescriptions are written for stool softener, iron, and trans emic acid.  RTC in 8 weeks for follow up.by Susana KAPLAN _______________ December 04, 2024 - Patient is a 28 y/o Portuguese Speaking Male w h/o Jovani Soullier Platelet Abnormality, Splenectomy, Recurrent Epistaxis seen in a f/u visit today.  Today's  CBC - WBC = 16.32, Hgb = 9.7, PLT = 141, 000, MCV = 66.9, - results d/w pt & wife copy given 1) Jovani Soullier PLT Abnormality - Thombocytopenia - PLT = 141 000 - stable  pt on Tranexamic Acid to take prn for bleeding - Continue prn - prescriptions current w refill vWF activity & Antigen, Ferritin, Iron studies, results pending 2) Anemia - on Oral iron - continue oral iron with OJ or Vitamin C to promote absorption, incorporate dietary iron rich foods, take stool softener prn 3) Leukocytosis - no s/s of infection - like reactive will monitor & repeat  4) MIPS Measures Questionnaire Completed  5) f/u w PCP, Dentist, ENT & other providers at their discretion - pt has no PCP - will email [facility] for PCP referral  RTO in 1 month or sooner prn  Medication compliance, physician f/u recommendations was advised. Pt / wife counseled to bring names / doses of all medications during visit. Avoid OTC / herbal supplements.  Significant amount of time was spent on counseling & educating the patient during this visit.  All questions, concerns were addressed with patient & wife during this visit to ther satisfaction.  Case management, care plan d/w Dr Rodrigue Hubbard    Services were utilized twice - due to connectivity issues  Even with use of  Services patient would ask the wife to repeat the questions and then answer - this mode of communication was repeated throughout the visit. 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 His diagnosis was confirmed in 2023 based on documents found on his cell phone while he was a patient at a tertiary referral hospital in [facility] [place]. He is concerned about the refills for his medication, Tranexamic Acid.  He reports experiencing 3 to 4 episodes of bleeding from both nostrils each month, with more frequent bleeding from the left nostril. The patient states that he takes the medication daily, and during episodes of epistaxis, he uses it up to three times per day for as long as 12 days. He believes that he can take the medication up to three times daily (TID) and reports that he tolerates it well without any significant side effects. The last time he picked up his prescription at the pharmacy was in February 2025   Labs: April 14, 2025 RET %  = 1.5% Absolute Ret = 74.6 K/uL	 RBC 4.94; WBC 12.85 K/uL; Hgb 8.6 g/dl; MCV 64.8; HCT 32.0%; PLT 127K/ul  1) Jovani Soullier PLT Abnormality - Thombocytopenia  - w Recurrent Epistaxis - today's PLT = 127  -  Continue the medication regimen as prescribed: take Tranexamic Acid every 12 hours as needed for epistaxis (nosebleeds).  2) Microcytic Anemia - Continue iron supplementation with vitamin C, preferably taken with orange juice. It is also advised to increase the consumption of iron-rich foods, such as red meats. Repeat Ferritin, Iron studies results pending - Monitor for and report any signs of bleeding - Patient advised to keep a diary / Log to monitor bleeding episodes and use of Tranexamic Acid   3) f/u w ENT for evaluation for cauterization procedure to treat Recurrent Epistaxis - Pt evaluated by ENT advised Nasal packing with improvement in treatment of recurrent Epistaxis   - Schedule a follow-up appointment in 2 weeks.  Services were utilized twice - due to connectivity issues

## 2025-04-14 NOTE — PHYSICAL EXAM
[Fully active, able to carry on all pre-disease performance without restriction] : Status 0 - Fully active, able to carry on all pre-disease performance without restriction [Normal] : affect appropriate [de-identified] : midline abdominal old well healed surgical scar  [de-identified] : numerous tatooes; no ecchymosis, petechiae, bleeding, bruising noted

## 2025-04-14 NOTE — PHYSICAL EXAM
[Fully active, able to carry on all pre-disease performance without restriction] : Status 0 - Fully active, able to carry on all pre-disease performance without restriction [Normal] : affect appropriate [de-identified] : midline abdominal old well healed surgical scar  [de-identified] : numerous tatooes; no ecchymosis, petechiae, bleeding, bruising noted

## 2025-04-14 NOTE — REVIEW OF SYSTEMS
[Diarrhea: Grade 0] : Diarrhea: Grade 0 [Easy Bleeding] : a tendency for easy bleeding [Easy Bruising] : a tendency for easy bruising [Negative] : Allergic/Immunologic [Swollen Glands] : no swollen glands [FreeTextEntry4] : No evidence of epistasis during the visit [de-identified] : numerous tattoos present trunk, arms and back without bleeding. [de-identified] : gums

## 2025-04-14 NOTE — REASON FOR VISIT
[Follow-Up Visit] : a follow-up visit for [Blood Count Assessment] : blood count assessment [Coagulopathy] : coagulopathy [Spouse] : spouse [Other: _____] : [unfilled] [Language Line ] : provided by Language Line   [Time Spent: ____ minutes] : Total time spent using  services: [unfilled] minutes. The patient's primary language is not English thus required  services. [FreeTextEntry2] : Jovani Soullier syndrome, Thromocytopenia  [Interpreters_IDNumber] : [id number]; [id number] [Interpreters_FullName] : Nia [TWNoteComboBox1] : Sri Lankan

## 2025-04-14 NOTE — RESULTS/DATA
[FreeTextEntry1] : platelet count 133 000; review of peripheral blood smear shows large platelets with basophilic center (appears as nucleoid but not nucleus); irregular borders. 05/08/2024 CBC WBC 9.50 HGB 9.2g/dL  000 copy given to the patient with an explanation 06/27/2024 CBC WBC 6 HGB 8.7 MCV 64  000 07/18/2024 CBC WBC 15.94 HGB 9.5g/dL MCV 66  000 08/21/2024 CBC WBC 15.2 HGB  .6g/dL  MCV 65  PL clumped _____________ December 04, 2024 - CBC - WBC = 16.32, Hgb = 9.7, PLT = 141, 000, MCV = 66.9,  ANC = 11.72. Monocyte = 1.61, Basophil # 0.22  Normal Retic Count Iron = 21, % Fe Sat = 5%, TIBC = 475, Ferritin = 7,  vWF activity & antigen WNL  _______________ April 14, 2025 RET %  = 1.5% Absolute Ret = 74.6 K/uL	 RBC 4.94; WBC 12.85 K/uL; Hgb 8.6 g/dl; MCV 64.8; HCT 32.0%; PLT 127K/ul

## 2025-04-14 NOTE — HISTORY OF PRESENT ILLNESS
[Disease:__________________________] : Disease: [unfilled] [Date: ____________] : Patient's last distress assessment performed on [unfilled]. [0 - No Distress] : Distress Level: 0 [100: Normal, no complaints, no evidence of disease.] : 100: Normal, no complaints, no evidence of disease. 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 He had epistaxis for 3 days last week thus went to urgent care. He had his nose packed twice in the past 4 weeks. he has told me that [place] the [place] treatment of Jovani Soullier as his treatments in [facility] were limited to nasal packing and occasional platelet transfusion. He has had rare occasions of packed red cell transfusion none in past year. He presented with a low platelet count noted by age 13 months and ecchymosis. 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 His illness has not been responsive to steroid therapy. He has had at least one embolization treatment that he describes as an embolization of a bleeding site (CNS/ or head) trough and artery (possibly femoral?). He has been taking tranexamic acid 650 mg PO for bleeding episodes and more recently he has used this medication on a daily prophylactic status. [FreeTextEntry1] : tranexemic acid 650 mg PO daily; iron 65 mg two tablets [de-identified] : second visit: patient is present for laboratory results. No bleeding noted; occasional gum bleeding. He has been using the TSX  06/27/2024: He has had occasional nasal bleeding that has ceased with compression. He has temporality been without transemic acid medication. Prescription had been written (renewal) on 06/24/2024. No ER visit and no transfusion. 07/18/2024 Patient had successful completion of platelet function testing. Difficult interpretation of the aggregation curve possibly due to spontaneous aggregation. There is no family history of thalassemia or iron deficiency. He eats red meat twice a month.  08/21/2024 seen post ER visit this week. He required nasal packing, and he is stable post packing and retention of packing for one day. Bleeding has now stopped. Patient has worsening iron deficiency anemia.    10/2/2024 He is here at a 6 week follow up follow. one  episode of bleeding from nose for 2 days he did not go to hospital; no ER visit. no hospitalization. No falls.  __________ December 04, 2024 - Seen in a f/u visit today accompanied by wife Edith. Pt Tristanian Speaking -  services utiliizied Twice during this visit - 1st - Mr. Mckenna ID [id number] self disconnected after 5 minutes, 2nd  Ms. Maloney ID # [id number]. Even with use of  Services patient would ask the wife to repeat the questions and then answer - this mode of communication was repeated throughout the visit. Did not remember exact doses of medications he was taking. Stated no change from what medicines were in his chart.  Pt stated he has been having recurrent intermittent episodes of bilateral epistaxis h/o Cauterization, evaluated by [facility] - advised Mupirocin ointment. Intermittent gum bleeding - unsure exact date of last dental evaluation -- stated maybe January 2024.  States he gets bored at home. No hospitalization, febrile illness, change in medical status.   April 14, 2025  Today, the patient comes in for a follow-up visit and a medication refill for Tranexamic Acid. He reports experiencing 3 to 4 episodes of bleeding from both nostrils each month, with more frequent bleeding from the left nostril. The patient states that he takes the medication daily, and during episodes of epistaxis, he uses it up to three times per day for as long as 12 days. He believes that he can take the medication up to three times daily (TID) and reports that he tolerates it well without any significant side effects. The last time he picked up his prescription at the pharmacy was in February 2025. He also mentions that when he takes oral iron, 2 tablets three times a week, the bleeding episodes decrease. The last episode of bleeding occurred about a week ago, and he was hospitalized at [facility] one month ago.  Pt evaluated by ENT advised Nasal packing with improvement in treatment of recurrent Epistaxis

## 2025-04-14 NOTE — REVIEW OF SYSTEMS
[Diarrhea: Grade 0] : Diarrhea: Grade 0 [Easy Bleeding] : a tendency for easy bleeding [Easy Bruising] : a tendency for easy bruising [Negative] : Allergic/Immunologic [Swollen Glands] : no swollen glands [FreeTextEntry4] : No evidence of epistasis during the visit [de-identified] : numerous tattoos present trunk, arms and back without bleeding. [de-identified] : gums

## 2025-04-29 NOTE — HISTORY OF PRESENT ILLNESS
[Disease:__________________________] : Disease: [unfilled] [Date: ____________] : Patient's last distress assessment performed on [unfilled]. [0 - No Distress] : Distress Level: 0 [100: Normal, no complaints, no evidence of disease.] : 100: Normal, no complaints, no evidence of disease. 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 He had epistaxis for 3 days last week thus went to urgent care. He had his nose packed twice in the past 4 weeks. he has told me that [place] of Jovani Soullier as his treatments in [facility] were limited to nasal packing and occasional platelet transfusion. He has had rare occasions of packed red cell transfusion none in past year. He presented with a low platelet count noted by age 13 months and ecchymosis. 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 His illness has not been responsive to steroid therapy. He has had at least one embolization treatment that he describes as an embolization of a bleeding site (CNS/ or head) trough and artery (possibly femoral?). He has been taking tranexamic acid 650 mg PO for bleeding episodes and more recently he has used this medication on a daily prophylactic status. [FreeTextEntry1] : tranexemic acid 650 mg PO daily; iron 65 mg two tablets [de-identified] : second visit: patient is present for laboratory results. No bleeding noted; occasional gum bleeding. He has been using the TSX  06/27/2024: He has had occasional nasal bleeding that has ceased with compression. He has temporality been without transemic acid medication. Prescription had been written (renewal) on 06/24/2024. No ER visit and no transfusion. 07/18/2024 Patient had successful completion of platelet function testing. Difficult interpretation of the aggregation curve possibly due to spontaneous aggregation. There is no family history of thalassemia or iron deficiency. He eats red meat twice a month.  08/21/2024 seen post ER visit this week. He required nasal packing, and he is stable post packing and retention of packing for one day. Bleeding has now stopped. Patient has worsening iron deficiency anemia.    10/2/2024 He is here at a 6 week follow up follow. one  episode of bleeding from nose for 2 days he did not go to hospital; no ER visit. no hospitalization. No falls.  __________ December 04, 2024 - Seen in a f/u visit today accompanied by wife Edith. Pt Montenegrin Speaking -  services utiliizied Twice during this visit - 1st - Mr. Mckenna ID [id number] self disconnected after 5 minutes, 2nd  Ms. Maloney ID # [id number]. Even with use of  Services patient would ask the wife to repeat the questions and then answer - this mode of communication was repeated throughout the visit. Did not remember exact doses of medications he was taking. Stated no change from what medicines were in his chart.  Pt stated he has been having recurrent intermittent episodes of bilateral epistaxis h/o Cauterization, evaluated by [facility] - advised Mupirocin ointment. Intermittent gum bleeding - unsure exact date of last dental evaluation -- stated maybe January 2024.  States he gets bored at home. No hospitalization, febrile illness, change in medical status.   April 14, 2025  Today, the patient comes in for a follow-up visit and a medication refill for Tranexamic Acid. He reports experiencing 3 to 4 episodes of bleeding from both nostrils each month, with more frequent bleeding from the left nostril. The patient states that he takes the medication daily, and during episodes of epistaxis, he uses it up to three times per day for as long as 12 days. He believes that he can take the medication up to three times daily (TID) and reports that he tolerates it well without any significant side effects. The last time he picked up his prescription at the pharmacy was in February 2025. He also mentions that when he takes oral iron, 2 tablets three times a week, the bleeding episodes decrease. The last episode of bleeding occurred about a week ago, and he was hospitalized at [facility] one month ago.  Pt evaluated by ENT advised Nasal packing with improvement in treatment of recurrent Epistaxis  ________________________ April 28, 2025 - Seen in a f/u visit today. Pt Montenegrin Speaking -  services utiliizied. Reports Intermittent, Recurrent, bilateral epistaxis uses nasal packing device with resolution. Last episode was last week on Monday & Tuesday. Takes Tranexamic acid daily. Has not followed up with ENT.  Pt reports he has seen ENT in 3 visit in past few months, ENT provider does not examine his nose and told him to use the medication when nose is dry.  Our EMR records shows pt was seen by ENT once on Nov 25, 2024 no other ENT notes were found.   Denies recent hospitalization, interval change in medical status, no febrile illness, bruising, petechiae, ecchymosis, purpura, hematuria, GIB bleed, gum bleeding, blood transfusions, iron infusions, PICA, fevers, chills, night sweats, weight loss, swollen glands Reports taking Oral iron as directed and stated in [place] he was getting IV iron routinely as his iron levels were low.

## 2025-04-29 NOTE — PHYSICAL EXAM
[Fully active, able to carry on all pre-disease performance without restriction] : Status 0 - Fully active, able to carry on all pre-disease performance without restriction [Normal] : affect appropriate [de-identified] : midline abdominal old well healed surgical scar  [de-identified] : numerous tatooes; no ecchymosis, petechiae, bleeding, bruising noted

## 2025-04-29 NOTE — REASON FOR VISIT
[Follow-Up Visit] : a follow-up visit for [Blood Count Assessment] : blood count assessment [Coagulopathy] : coagulopathy [Spouse] : spouse [Other: _____] : [unfilled] [Language Line ] : provided by Language Line   [Time Spent: ____ minutes] : Total time spent using  services: [unfilled] minutes. The patient's primary language is not English thus required  services. [FreeTextEntry2] : Jovani Soullier syndrome, Thromocytopenia  [Interpreters_IDNumber] : [id number] [Interpreters_FullName] : Katlyn  [TWNoteComboBox1] : Slovenian

## 2025-04-29 NOTE — RESULTS/DATA
[FreeTextEntry1] : platelet count 133 000; review of peripheral blood smear shows large platelets with basophilic center (appears as nucleoid but not nucleus); irregular borders. 05/08/2024 CBC WBC 9.50 HGB 9.2g/dL  000 copy given to the patient with an explanation 06/27/2024 CBC WBC 6 HGB 8.7 MCV 64  000 07/18/2024 CBC WBC 15.94 HGB 9.5g/dL MCV 66  000 08/21/2024 CBC WBC 15.2 HGB  .6g/dL  MCV 65  PL clumped _____________ December 04, 2024 - CBC - WBC = 16.32, Hgb = 9.7, PLT = 141, 000, MCV = 66.9,  ANC = 11.72. Monocyte = 1.61, Basophil # 0.22  Normal Retic Count Iron = 21, % Fe Sat = 5%, TIBC = 475, Ferritin = 7,  vWF activity & antigen WNL  _______________ April 14, 2025 RET %  = 1.5% Absolute Ret = 74.6 K/uL	 RBC 4.94; WBC 12.85 K/uL; Hgb 8.6 g/dl; MCV 64.8; HCT 32.0%; PLT 127K/ul _________________ April 14, 2025 -  Ferritin = 5, Iron = 16, % Fe Sat = 3%, TIBC = 500, B12 = 420, Folate = 10.7   _______ April 28, 2025 -  Today's CBC w Diff - WBC = 12.29, Hgb = 8.8, PLT = 160,000 , MCV = 65.5 -  results d/w patient   Monocyte # 1.84 Basophil= 0.28

## 2025-04-29 NOTE — REASON FOR VISIT
[Follow-Up Visit] : a follow-up visit for [Blood Count Assessment] : blood count assessment [Coagulopathy] : coagulopathy [Spouse] : spouse [Other: _____] : [unfilled] [Language Line ] : provided by Language Line   [Time Spent: ____ minutes] : Total time spent using  services: [unfilled] minutes. The patient's primary language is not English thus required  services. [FreeTextEntry2] : Jovani Soullier syndrome, Thromocytopenia  [Interpreters_IDNumber] : [id number] [Interpreters_FullName] : Katlyn  [TWNoteComboBox1] : Gibraltarian risk factors

## 2025-04-29 NOTE — HISTORY OF PRESENT ILLNESS
[Disease:__________________________] : Disease: [unfilled] [Date: ____________] : Patient's last distress assessment performed on [unfilled]. [0 - No Distress] : Distress Level: 0 [100: Normal, no complaints, no evidence of disease.] : 100: Normal, no complaints, no evidence of disease. 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 He had epistaxis for 3 days last week thus went to urgent care. He had his nose packed twice in the past 4 weeks. he has told me that [place] of Jovani Soullier as his treatments in [facility] were limited to nasal packing and occasional platelet transfusion. He has had rare occasions of packed red cell transfusion none in past year. He presented with a low platelet count noted by age 13 months and ecchymosis. 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 His illness has not been responsive to steroid therapy. He has had at least one embolization treatment that he describes as an embolization of a bleeding site (CNS/ or head) trough and artery (possibly femoral?). He has been taking tranexamic acid 650 mg PO for bleeding episodes and more recently he has used this medication on a daily prophylactic status. [FreeTextEntry1] : tranexemic acid 650 mg PO daily; iron 65 mg two tablets [de-identified] : second visit: patient is present for laboratory results. No bleeding noted; occasional gum bleeding. He has been using the TSX  06/27/2024: He has had occasional nasal bleeding that has ceased with compression. He has temporality been without transemic acid medication. Prescription had been written (renewal) on 06/24/2024. No ER visit and no transfusion. 07/18/2024 Patient had successful completion of platelet function testing. Difficult interpretation of the aggregation curve possibly due to spontaneous aggregation. There is no family history of thalassemia or iron deficiency. He eats red meat twice a month.  08/21/2024 seen post ER visit this week. He required nasal packing, and he is stable post packing and retention of packing for one day. Bleeding has now stopped. Patient has worsening iron deficiency anemia.    10/2/2024 He is here at a 6 week follow up follow. one  episode of bleeding from nose for 2 days he did not go to hospital; no ER visit. no hospitalization. No falls.  __________ December 04, 2024 - Seen in a f/u visit today accompanied by wife Edith. Pt Ugandan Speaking -  services utiliizied Twice during this visit - 1st - Mr. Mckenna ID [id number] self disconnected after 5 minutes, 2nd  Ms. Maloney ID # [id number]. Even with use of  Services patient would ask the wife to repeat the questions and then answer - this mode of communication was repeated throughout the visit. Did not remember exact doses of medications he was taking. Stated no change from what medicines were in his chart.  Pt stated he has been having recurrent intermittent episodes of bilateral epistaxis h/o Cauterization, evaluated by [facility] - advised Mupirocin ointment. Intermittent gum bleeding - unsure exact date of last dental evaluation -- stated maybe January 2024.  States he gets bored at home. No hospitalization, febrile illness, change in medical status.   April 14, 2025  Today, the patient comes in for a follow-up visit and a medication refill for Tranexamic Acid. He reports experiencing 3 to 4 episodes of bleeding from both nostrils each month, with more frequent bleeding from the left nostril. The patient states that he takes the medication daily, and during episodes of epistaxis, he uses it up to three times per day for as long as 12 days. He believes that he can take the medication up to three times daily (TID) and reports that he tolerates it well without any significant side effects. The last time he picked up his prescription at the pharmacy was in February 2025. He also mentions that when he takes oral iron, 2 tablets three times a week, the bleeding episodes decrease. The last episode of bleeding occurred about a week ago, and he was hospitalized at [facility] one month ago.  Pt evaluated by ENT advised Nasal packing with improvement in treatment of recurrent Epistaxis  ________________________ April 28, 2025 - Seen in a f/u visit today. Pt Ugandan Speaking -  services utiliizied. Reports Intermittent, Recurrent, bilateral epistaxis uses nasal packing device with resolution. Last episode was last week on Monday & Tuesday. Takes Tranexamic acid daily. Has not followed up with ENT.  Pt reports he has seen ENT in 3 visit in past few months, ENT provider does not examine his nose and told him to use the medication when nose is dry.  Our EMR records shows pt was seen by ENT once on Nov 25, 2024 no other ENT notes were found.   Denies recent hospitalization, interval change in medical status, no febrile illness, bruising, petechiae, ecchymosis, purpura, hematuria, GIB bleed, gum bleeding, blood transfusions, iron infusions, PICA, fevers, chills, night sweats, weight loss, swollen glands Reports taking Oral iron as directed and stated in [place] he was getting IV iron routinely as his iron levels were low.

## 2025-04-29 NOTE — ASSESSMENT
[Supportive] : Goals of care discussed with patient: Supportive [Palliative Care Plan] : not applicable at this time [FreeTextEntry1] :  April 28, 2025 - Patient is a 28 y/o Panamanian Speaking Male w h/o Jovani Jaramillo Platelet Abnormality, Splenectomy, Recurrent Epistaxis, Anemia seen in a f/u visit today.  Reports Intermittent, Recurrent, bilateral epistaxis - uses nasal packing device, Tranexamic Acid.    Lab results April 14, 2025 - Ferritin = 5, Iron = 16, % Fe Sat = 3%, TIBC = 500, B12 = 420, Folate = 10.7 - d/w pt copies given    Today's CBC w Diff - WBC = 12.29, Hgb = 8.8, PLT = 160,000 , MCV = 65.5 - results d/w patient copies given  Monocyte # 1.84 Basophil= 0.28   1) Jovani Jaramillo PLT Abnormality - Thrombocytopenia - w Recurrent Epistaxis, No bruising reported - today's PLT = 160,000   Continue the medication regimen as prescribed: take Tranexamic Acid every 12 hours at the onset of bleeding episode for 5 days prn for epistaxis.   - Monitor for and report any signs of bleeding - Patient advised to keep a diary / Log to monitor bleeding episodes and use of Tranexamic Acid - No log provided for review   2)  Microcytic Anemia - Today's Hgb = 8.8 - Multifactorial - Iron studies, Ferritin levels low - Non-compliance with Oral iron regimen / dietary iron  Rec IV iron infusion Venofer weekly x 4 doses - pt has h/o prior IV iron infusions in [place] without any issues   R/B/A were explained to patient - Informed Consent obtained for & IV iron infusion, Continue iron supplementation with vitamin C, preferably taken with orange juice. It is also advised to increase the consumption of iron-rich foods, such as red meats. B12 levels - normal on low side - Start B12 supplements - prescription sent to [facility]  3) f/u w ENT for evaluation for cauterization procedure to treat Recurrent Epistaxis - Pt evaluated by ENT advised Nasal packing with improvement in treatment of recurrent Epistaxis  f/u for reevaluation of use of nasal drops / cauterization as options   Medication compliance, physician follow ups was advised / emphasized.  Significant time was spent with patient regarding education and emphasizing f/u w physicians.  All questions, concerns were addressed with patient during this visit to his satisfaction, verbalized understanding. RTO in 3 months or sooner w Dr Hubbard Case management, care plan discussed with Dr Hubbard   Time spent with  > 1 hour

## 2025-04-29 NOTE — REVIEW OF SYSTEMS
[Diarrhea: Grade 0] : Diarrhea: Grade 0 [Easy Bleeding] : a tendency for easy bleeding [Negative] : Allergic/Immunologic [Easy Bruising] : no tendency for easy bruising [Swollen Glands] : no swollen glands [FreeTextEntry4] : No evidence of epistasis during the visit [de-identified] : numerous tattoos present trunk, arms and back  [de-identified] : Epistaxis Frequent episodes

## 2025-04-29 NOTE — REVIEW OF SYSTEMS
[Diarrhea: Grade 0] : Diarrhea: Grade 0 [Easy Bleeding] : a tendency for easy bleeding [Negative] : Allergic/Immunologic [Easy Bruising] : no tendency for easy bruising [Swollen Glands] : no swollen glands [FreeTextEntry4] : No evidence of epistasis during the visit [de-identified] : numerous tattoos present trunk, arms and back  [de-identified] : Epistaxis Frequent episodes

## 2025-04-29 NOTE — ASSESSMENT
[Supportive] : Goals of care discussed with patient: Supportive [Palliative Care Plan] : not applicable at this time [FreeTextEntry1] :  April 28, 2025 - Patient is a 28 y/o Ugandan Speaking Male w h/o Jovani Jaramillo Platelet Abnormality, Splenectomy, Recurrent Epistaxis, Anemia seen in a f/u visit today.  Reports Intermittent, Recurrent, bilateral epistaxis - uses nasal packing device, Tranexamic Acid.    Lab results April 14, 2025 - Ferritin = 5, Iron = 16, % Fe Sat = 3%, TIBC = 500, B12 = 420, Folate = 10.7 - d/w pt copies given    Today's CBC w Diff - WBC = 12.29, Hgb = 8.8, PLT = 160,000 , MCV = 65.5 - results d/w patient copies given  Monocyte # 1.84 Basophil= 0.28   1) Jovani Jaramillo PLT Abnormality - Thrombocytopenia - w Recurrent Epistaxis, No bruising reported - today's PLT = 160,000   Continue the medication regimen as prescribed: take Tranexamic Acid every 12 hours at the onset of bleeding episode for 5 days prn for epistaxis.   - Monitor for and report any signs of bleeding - Patient advised to keep a diary / Log to monitor bleeding episodes and use of Tranexamic Acid - No log provided for review   2)  Microcytic Anemia - Today's Hgb = 8.8 - Multifactorial - Iron studies, Ferritin levels low - Non-compliance with Oral iron regimen / dietary iron  Rec IV iron infusion Venofer weekly x 4 doses - pt has h/o prior IV iron infusions in [place] without any issues   R/B/A were explained to patient - Informed Consent obtained for & IV iron infusion, Continue iron supplementation with vitamin C, preferably taken with orange juice. It is also advised to increase the consumption of iron-rich foods, such as red meats. B12 levels - normal on low side - Start B12 supplements - prescription sent to [facility]  3) f/u w ENT for evaluation for cauterization procedure to treat Recurrent Epistaxis - Pt evaluated by ENT advised Nasal packing with improvement in treatment of recurrent Epistaxis  f/u for reevaluation of use of nasal drops / cauterization as options   Medication compliance, physician follow ups was advised / emphasized.  Significant time was spent with patient regarding education and emphasizing f/u w physicians.  All questions, concerns were addressed with patient during this visit to his satisfaction, verbalized understanding. RTO in 3 months or sooner w Dr Hubbard Case management, care plan discussed with Dr Hubbard   Time spent with  > 1 hour

## 2025-04-29 NOTE — PHYSICAL EXAM
[Fully active, able to carry on all pre-disease performance without restriction] : Status 0 - Fully active, able to carry on all pre-disease performance without restriction [Normal] : affect appropriate [de-identified] : midline abdominal old well healed surgical scar  [de-identified] : numerous tatooes; no ecchymosis, petechiae, bleeding, bruising noted

## 2025-07-30 NOTE — ASSESSMENT
[Supportive] : Goals of care discussed with patient: Supportive [Palliative Care Plan] : not applicable at this time [FreeTextEntry1] :  April 28, 2025 - Patient is a 28 y/o Nigerien Speaking Male w h/o Jovani Jaramillo Platelet Abnormality, Splenectomy, Recurrent Epistaxis, Anemia seen in a f/u visit today.  Reports Intermittent, Recurrent, bilateral epistaxis - uses nasal packing device, Tranexamic Acid.    Lab results April 14, 2025 - Ferritin = 5, Iron = 16, % Fe Sat = 3%, TIBC = 500, B12 = 420, Folate = 10.7 - d/w pt copies given    Today's CBC w Diff - WBC = 12.29, Hgb = 8.8, PLT = 160,000 , MCV = 65.5 - results d/w patient copies given  Monocyte # 1.84 Basophil= 0.28   1) Jovani Jaramillo PLT Abnormality - Thrombocytopenia - w Recurrent Epistaxis, No bruising reported - today's PLT = 160,000   Continue the medication regimen as prescribed: take Tranexamic Acid every 12 hours at the onset of bleeding episode for 5 days prn for epistaxis.   - Monitor for and report any signs of bleeding - Patient advised to keep a diary / Log to monitor bleeding episodes and use of Tranexamic Acid - No log provided for review   2)  Microcytic Anemia - Today's Hgb = 8.8 - Multifactorial - Iron studies, Ferritin levels low - Non-compliance with Oral iron regimen / dietary iron  Rec IV iron infusion Venofer weekly x 4 doses - pt has h/o prior IV iron infusions in [place] without any issues   R/B/A were explained to patient - Informed Consent obtained for & IV iron infusion, Continue iron supplementation with vitamin C, preferably taken with orange juice. It is also advised to increase the consumption of iron-rich foods, such as red meats. B12 levels - normal on low side - Start B12 supplements - prescription sent to [facility]  3) f/u w ENT for evaluation for cauterization procedure to treat Recurrent Epistaxis - Pt evaluated by ENT advised Nasal packing with improvement in treatment of recurrent Epistaxis  f/u for reevaluation of use of nasal drops / cauterization as options   Medication compliance, physician follow ups was advised / emphasized.  Significant time was spent with patient regarding education and emphasizing f/u w physicians.  All questions, concerns were addressed with patient during this visit to his satisfaction, verbalized understanding. RTO in 3 months or sooner w Dr Hubbard Case management, care plan discussed with Dr Hubbard   Time spent with  > 1 hour

## 2025-07-30 NOTE — ASSESSMENT
[Supportive] : Goals of care discussed with patient: Supportive [Palliative Care Plan] : not applicable at this time [FreeTextEntry1] :  April 28, 2025 - Patient is a 28 y/o Tristanian Speaking Male w h/o Jovani Jaramillo Platelet Abnormality, Splenectomy, Recurrent Epistaxis, Anemia seen in a f/u visit today.  Reports Intermittent, Recurrent, bilateral epistaxis - uses nasal packing device, Tranexamic Acid.    Lab results April 14, 2025 - Ferritin = 5, Iron = 16, % Fe Sat = 3%, TIBC = 500, B12 = 420, Folate = 10.7 - d/w pt copies given    Today's CBC w Diff - WBC = 12.29, Hgb = 8.8, PLT = 160,000 , MCV = 65.5 - results d/w patient copies given  Monocyte # 1.84 Basophil= 0.28   1) Jovani Jaramillo PLT Abnormality - Thrombocytopenia - w Recurrent Epistaxis, No bruising reported - today's PLT = 160,000   Continue the medication regimen as prescribed: take Tranexamic Acid every 12 hours at the onset of bleeding episode for 5 days prn for epistaxis.   - Monitor for and report any signs of bleeding - Patient advised to keep a diary / Log to monitor bleeding episodes and use of Tranexamic Acid - No log provided for review   2)  Microcytic Anemia - Today's Hgb = 8.8 - Multifactorial - Iron studies, Ferritin levels low - Non-compliance with Oral iron regimen / dietary iron  Rec IV iron infusion Venofer weekly x 4 doses - pt has h/o prior IV iron infusions in [place] without any issues   R/B/A were explained to patient - Informed Consent obtained for & IV iron infusion, Continue iron supplementation with vitamin C, preferably taken with orange juice. It is also advised to increase the consumption of iron-rich foods, such as red meats. B12 levels - normal on low side - Start B12 supplements - prescription sent to [facility]  3) f/u w ENT for evaluation for cauterization procedure to treat Recurrent Epistaxis - Pt evaluated by ENT advised Nasal packing with improvement in treatment of recurrent Epistaxis  f/u for reevaluation of use of nasal drops / cauterization as options   Medication compliance, physician follow ups was advised / emphasized.  Significant time was spent with patient regarding education and emphasizing f/u w physicians.  All questions, concerns were addressed with patient during this visit to his satisfaction, verbalized understanding. RTO in 3 months or sooner w Dr Hubbard Case management, care plan discussed with Dr Hubbard   Time spent with  > 1 hour

## 2025-07-30 NOTE — HISTORY OF PRESENT ILLNESS
[Disease:__________________________] : Disease: [unfilled] [Date: ____________] : Patient's last distress assessment performed on [unfilled]. [0 - No Distress] : Distress Level: 0 [100: Normal, no complaints, no evidence of disease.] : 100: Normal, no complaints, no evidence of disease. 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 He had epistaxis for 3 days last week thus went to urgent care. He had his nose packed twice in the past 4 weeks. he has told me that [place] the [place] further treatment of Jovani Soullier as his treatments in [facility] were limited to nasal packing and occasional platelet transfusion. He has had rare occasions of packed red cell transfusion none in past year. He presented with a low platelet count noted by age 13 months and ecchymosis. He had spleen removal at age 3 with post operative bleeding and re-exploration of abdomen for packing and platelet infusion in [place]. His illness has not been responsive to steroid therapy. He has had at least one embolization treatment that he describes as an embolization of a bleeding site (CNS/ or head) trough and artery (possibly femoral?). He has been taking tranexamic acid 650 mg PO for bleeding episodes and more recently he has used this medication on a daily prophylactic status. [FreeTextEntry1] : tranexemic acid 650 mg PO daily; iron 65 mg two tablets [de-identified] : second visit: patient is present for laboratory results. No bleeding noted; occasional gum bleeding. He has been using the TSX  06/27/2024: He has had occasional nasal bleeding that has ceased with compression. He has temporality been without transemic acid medication. Prescription had been written (renewal) on 06/24/2024. No ER visit and no transfusion. 07/18/2024 Patient had successful completion of platelet function testing. Difficult interpretation of the aggregation curve possibly due to spontaneous aggregation. There is no family history of thalassemia or iron deficiency. He eats red meat twice a month.  08/21/2024 seen post ER visit this week. He required nasal packing, and he is stable post packing and retention of packing for one day. Bleeding has now stopped. Patient has worsening iron deficiency anemia.    10/2/2024 He is here at a 6 week follow up follow. one  episode of bleeding from nose for 2 days he did not go to hospital; no ER visit. no hospitalization. No falls.  __________ December 04, 2024 - Seen in a f/u visit today accompanied by wife Edith. Pt Mozambican Speaking -  services utiliizied Twice during this visit - 1st - Mr. Mckenna ID [id number] self disconnected after 5 minutes, 2nd  Ms. Maloney ID # [id number]. Even with use of  Services patient would ask the wife to repeat the questions and then answer - this mode of communication was repeated throughout the visit. Did not remember exact doses of medications he was taking. Stated no change from what medicines were in his chart.  Pt stated he has been having recurrent intermittent episodes of bilateral epistaxis h/o Cauterization, evaluated by [facility] - advised Mupirocin ointment. Intermittent gum bleeding - unsure exact date of last dental evaluation -- stated maybe January 2024.  States he gets bored at home. No hospitalization, febrile illness, change in medical status.   April 14, 2025  Today, the patient comes in for a follow-up visit and a medication refill for Tranexamic Acid. He reports experiencing 3 to 4 episodes of bleeding from both nostrils each month, with more frequent bleeding from the left nostril. The patient states that he takes the medication daily, and during episodes of epistaxis, he uses it up to three times per day for as long as 12 days. He believes that he can take the medication up to three times daily (TID) and reports that he tolerates it well without any significant side effects. The last time he picked up his prescription at the pharmacy was in February 2025. He also mentions that when he takes oral iron, 2 tablets three times a week, the bleeding episodes decrease. The last episode of bleeding occurred about a week ago, and he was hospitalized at [facility] one month ago.  Pt evaluated by ENT advised Nasal packing with improvement in treatment of recurrent Epistaxis  ________________________ April 28, 2025 - Seen in a f/u visit today. Pt Mozambican Speaking -  services utiliizied. Reports Intermittent, Recurrent, bilateral epistaxis uses nasal packing device with resolution. Last episode was last week on Monday & Tuesday. Takes Tranexamic acid daily. Has not followed up with ENT.  Pt reports he has seen ENT in 3 visit in past few months, ENT provider does not examine his nose and told him to use the medication when nose is dry.  Our EMR records shows pt was seen by ENT once on Nov 25, 2024 no other ENT notes were found.   Denies recent hospitalization, interval change in medical status, no febrile illness, bruising, petechiae, ecchymosis, purpura, hematuria, GIB bleed, gum bleeding, blood transfusions, iron infusions, PICA, fevers, chills, night sweats, weight loss, swollen glands Reports taking Oral iron as directed and stated in [place] he was getting IV iron routinely as his iron levels were low.

## 2025-07-30 NOTE — PHYSICAL EXAM
[Fully active, able to carry on all pre-disease performance without restriction] : Status 0 - Fully active, able to carry on all pre-disease performance without restriction [Normal] : affect appropriate [de-identified] : midline abdominal old well healed surgical scar  [de-identified] : numerous tatooes; no ecchymosis, petechiae, bleeding, bruising noted

## 2025-07-30 NOTE — HISTORY OF PRESENT ILLNESS
[Disease:__________________________] : Disease: [unfilled] [Date: ____________] : Patient's last distress assessment performed on [unfilled]. [0 - No Distress] : Distress Level: 0 [100: Normal, no complaints, no evidence of disease.] : 100: Normal, no complaints, no evidence of disease. 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 He had epistaxis for 3 days last week thus went to urgent care. He had his nose packed twice in the past 4 weeks. he has told me that [place] of Jovani Soullier as his treatments in [facility] were limited to nasal packing and occasional platelet transfusion. He has had rare occasions of packed red cell transfusion none in past year. He presented with a low platelet count noted by age 13 months and ecchymosis. He had spleen removal at age 3 with post operative bleeding and re-exploration of abdomen for packing and platelet infusion in [place]. His illness has not been responsive to steroid therapy. He has had at least one embolization treatment that he describes as an embolization of a bleeding site (CNS/ or head) trough and artery (possibly femoral?). He has been taking tranexamic acid 650 mg PO for bleeding episodes and more recently he has used this medication on a daily prophylactic status. [FreeTextEntry1] : tranexemic acid 650 mg PO daily; iron 65 mg two tablets [de-identified] : second visit: patient is present for laboratory results. No bleeding noted; occasional gum bleeding. He has been using the TSX  06/27/2024: He has had occasional nasal bleeding that has ceased with compression. He has temporality been without transemic acid medication. Prescription had been written (renewal) on 06/24/2024. No ER visit and no transfusion. 07/18/2024 Patient had successful completion of platelet function testing. Difficult interpretation of the aggregation curve possibly due to spontaneous aggregation. There is no family history of thalassemia or iron deficiency. He eats red meat twice a month.  08/21/2024 seen post ER visit this week. He required nasal packing, and he is stable post packing and retention of packing for one day. Bleeding has now stopped. Patient has worsening iron deficiency anemia.    10/2/2024 He is here at a 6 week follow up follow. one  episode of bleeding from nose for 2 days he did not go to hospital; no ER visit. no hospitalization. No falls.  __________ December 04, 2024 - Seen in a f/u visit today accompanied by wife Edith. Pt Niuean Speaking -  services utiliizied Twice during this visit - 1st - Mr. Mckenna ID [id number] self disconnected after 5 minutes, 2nd  Ms. Maloney ID # [id number]. Even with use of  Services patient would ask the wife to repeat the questions and then answer - this mode of communication was repeated throughout the visit. Did not remember exact doses of medications he was taking. Stated no change from what medicines were in his chart.  Pt stated he has been having recurrent intermittent episodes of bilateral epistaxis h/o Cauterization, evaluated by [facility] - advised Mupirocin ointment. Intermittent gum bleeding - unsure exact date of last dental evaluation -- stated maybe January 2024.  States he gets bored at home. No hospitalization, febrile illness, change in medical status.   April 14, 2025  Today, the patient comes in for a follow-up visit and a medication refill for Tranexamic Acid. He reports experiencing 3 to 4 episodes of bleeding from both nostrils each month, with more frequent bleeding from the left nostril. The patient states that he takes the medication daily, and during episodes of epistaxis, he uses it up to three times per day for as long as 12 days. He believes that he can take the medication up to three times daily (TID) and reports that he tolerates it well without any significant side effects. The last time he picked up his prescription at the pharmacy was in February 2025. He also mentions that when he takes oral iron, 2 tablets three times a week, the bleeding episodes decrease. The last episode of bleeding occurred about a week ago, and he was hospitalized at [facility] one month ago.  Pt evaluated by ENT advised Nasal packing with improvement in treatment of recurrent Epistaxis  ________________________ April 28, 2025 - Seen in a f/u visit today. Pt Niuean Speaking -  services utiliizied. Reports Intermittent, Recurrent, bilateral epistaxis uses nasal packing device with resolution. Last episode was last week on Monday & Tuesday. Takes Tranexamic acid daily. Has not followed up with ENT.  Pt reports he has seen ENT in 3 visit in past few months, ENT provider does not examine his nose and told him to use the medication when nose is dry.  Our EMR records shows pt was seen by ENT once on Nov 25, 2024 no other ENT notes were found.   Denies recent hospitalization, interval change in medical status, no febrile illness, bruising, petechiae, ecchymosis, purpura, hematuria, GIB bleed, gum bleeding, blood transfusions, iron infusions, PICA, fevers, chills, night sweats, weight loss, swollen glands Reports taking Oral iron as directed and stated in [place] he was getting IV iron routinely as his iron levels were low.

## 2025-07-30 NOTE — PHYSICAL EXAM
[Fully active, able to carry on all pre-disease performance without restriction] : Status 0 - Fully active, able to carry on all pre-disease performance without restriction [Normal] : affect appropriate [de-identified] : midline abdominal old well healed surgical scar  [de-identified] : numerous tatooes; no ecchymosis, petechiae, bleeding, bruising noted

## 2025-07-30 NOTE — REASON FOR VISIT
[Follow-Up Visit] : a follow-up visit for [Blood Count Assessment] : blood count assessment [Coagulopathy] : coagulopathy [Language Line ] : provided by Language Line   [FreeTextEntry2] : Jovani Soullier syndrome, Thromocytopenia  [Interpreters_IDNumber] : [id number] [Interpreters_FullName] : Katlyn  [TWNoteComboBox1] : Iranian

## 2025-07-30 NOTE — REASON FOR VISIT
[Follow-Up Visit] : a follow-up visit for [Blood Count Assessment] : blood count assessment [Coagulopathy] : coagulopathy [Language Line ] : provided by Language Line   [FreeTextEntry2] : Jovani Soullier syndrome, Thromocytopenia  [Interpreters_IDNumber] : [id number] [Interpreters_FullName] : Katlyn  [TWNoteComboBox1] : Citizen of [place]